# Patient Record
Sex: FEMALE | Race: WHITE | URBAN - METROPOLITAN AREA
[De-identification: names, ages, dates, MRNs, and addresses within clinical notes are randomized per-mention and may not be internally consistent; named-entity substitution may affect disease eponyms.]

---

## 2023-03-15 LAB
AMB EXT ANTIBODY SCREEN: NEGATIVE
AMB EXT CHLAMYDIA, NUCLEIC ACID AMP: NOT DETECTED
AMB EXT GONOCOCCUS, NUCLEIC ACID AMP: NOT DETECTED
AMB EXT HEMATOCRIT: 38.6
AMB EXT HEMOGLOBIN: 12.7
AMB EXT MCV: 88.5
AMB EXT PLATELETS: 255
AMB EXT RH FACTOR: POSITIVE

## 2023-05-03 ENCOUNTER — TELEPHONE (OUTPATIENT)
Dept: OBGYN CLINIC | Facility: CLINIC | Age: 23
End: 2023-05-03

## 2023-05-03 NOTE — TELEPHONE ENCOUNTER
PN records received. Pt notified & M&G appt offered & scheduled. Emphasized importance of continuing care w/ current provider as appt is not a guarantee of acceptance.  Pt verbalized an understanding & agrees w/ plan

## 2023-05-09 ENCOUNTER — OFFICE VISIT (OUTPATIENT)
Dept: OBGYN CLINIC | Facility: CLINIC | Age: 23
End: 2023-05-09

## 2023-05-09 DIAGNOSIS — Z71.89 PRENATAL CONSULT: Primary | ICD-10-CM

## 2023-05-09 NOTE — PROGRESS NOTES
Dhruv Morgan is 21year old, , with current EGA of 25w5d, here for Meet & Greet. C/s in 2020 for failure to descend. Reports she got to complete and pushing. She thinks baby was lyn side up. Feels like she was dismissed a lot in labor and things weren't explained to her. She wants a more personal experience this time. Denies any complications with her first pregnancy. Denies HTN/pre-e or GDM. She desires VTOL as long as its safe for her and baby. Open to RCS if that were recommended/needed. So far this pregnancy has been uncomplicated other than not finding out she was pregnant until she was 4 months along. Has been getting care at [facility] but prefers to deliver at [facility].  CNM Scope of care and philosophy reviewed. Records are at [facility] office. This CNM will review them on Thursday and have RN call pt to get her scheduled for RN ED visit as long as she is appropriate for CNM care after reviewing records. Patient voiced understanding.

## 2023-06-08 ENCOUNTER — TELEPHONE (OUTPATIENT)
Dept: OBGYN CLINIC | Facility: CLINIC | Age: 23
End: 2023-06-08

## 2023-06-08 NOTE — TELEPHONE ENCOUNTER
Per Gladis Border, pt accepted to transfer to this office for prenatal care. Attempted to call pt to schedule education visit and New OB visit. Received voicemail. LMTCB.

## 2023-06-15 LAB
AMB EXT GLUCOSE 1HR OB: 101
AMB EXT HEMATOCRIT: 35.1
AMB EXT HEMOGLOBIN: 11.9
AMB EXT HIV AG AB COMBO: NON REACTIVE

## 2023-06-27 ENCOUNTER — NURSE ONLY (OUTPATIENT)
Dept: OBGYN CLINIC | Facility: CLINIC | Age: 23
End: 2023-06-27
Payer: MEDICAID

## 2023-06-27 ENCOUNTER — INITIAL PRENATAL (OUTPATIENT)
Dept: OBGYN CLINIC | Facility: CLINIC | Age: 23
End: 2023-06-27

## 2023-06-27 VITALS
BODY MASS INDEX: 30 KG/M2 | DIASTOLIC BLOOD PRESSURE: 75 MMHG | WEIGHT: 167 LBS | SYSTOLIC BLOOD PRESSURE: 112 MMHG | HEART RATE: 96 BPM

## 2023-06-27 VITALS — HEIGHT: 63 IN

## 2023-06-27 DIAGNOSIS — O34.219 PREVIOUS CESAREAN DELIVERY AFFECTING PREGNANCY: Primary | ICD-10-CM

## 2023-06-27 PROCEDURE — 0502F SUBSEQUENT PRENATAL CARE: CPT | Performed by: ADVANCED PRACTICE MIDWIFE

## 2023-06-27 PROCEDURE — 3074F SYST BP LT 130 MM HG: CPT | Performed by: ADVANCED PRACTICE MIDWIFE

## 2023-06-27 PROCEDURE — 99211 OFF/OP EST MAY X REQ PHY/QHP: CPT | Performed by: ADVANCED PRACTICE MIDWIFE

## 2023-06-27 PROCEDURE — 3078F DIAST BP <80 MM HG: CPT | Performed by: ADVANCED PRACTICE MIDWIFE

## 2023-07-12 ENCOUNTER — ROUTINE PRENATAL (OUTPATIENT)
Dept: OBGYN CLINIC | Facility: CLINIC | Age: 23
End: 2023-07-12

## 2023-07-12 VITALS
HEART RATE: 80 BPM | BODY MASS INDEX: 30 KG/M2 | DIASTOLIC BLOOD PRESSURE: 72 MMHG | SYSTOLIC BLOOD PRESSURE: 108 MMHG | WEIGHT: 169 LBS

## 2023-07-12 DIAGNOSIS — Z34.90 FUNDAL HEIGHT HIGH FOR DATES: Primary | ICD-10-CM

## 2023-07-12 PROCEDURE — 3074F SYST BP LT 130 MM HG: CPT | Performed by: ADVANCED PRACTICE MIDWIFE

## 2023-07-12 PROCEDURE — 3078F DIAST BP <80 MM HG: CPT | Performed by: ADVANCED PRACTICE MIDWIFE

## 2023-07-12 PROCEDURE — 0502F SUBSEQUENT PRENATAL CARE: CPT | Performed by: ADVANCED PRACTICE MIDWIFE

## 2023-07-12 NOTE — PROGRESS NOTES
Active fetus Denies any complaints. Denies any vaginal bleeding, leaking of fluid or vaginal discharge. No signs signs of PTL. Reviewed S&S of PTL  Warning signs reviewed  All questions answered.    Fundal height large for dates- growth ultrasound ordered

## 2023-07-22 PROBLEM — Z34.90 FUNDAL HEIGHT HIGH FOR DATES: Status: ACTIVE | Noted: 2023-07-22

## 2023-07-22 PROBLEM — Z34.90 FUNDAL HEIGHT HIGH FOR DATES (HCC): Status: ACTIVE | Noted: 2023-07-22

## 2023-07-24 ENCOUNTER — TELEPHONE (OUTPATIENT)
Dept: OBGYN CLINIC | Facility: CLINIC | Age: 23
End: 2023-07-24

## 2023-07-24 ENCOUNTER — ROUTINE PRENATAL (OUTPATIENT)
Dept: OBGYN CLINIC | Facility: CLINIC | Age: 23
End: 2023-07-24

## 2023-07-24 VITALS
HEART RATE: 84 BPM | SYSTOLIC BLOOD PRESSURE: 111 MMHG | DIASTOLIC BLOOD PRESSURE: 69 MMHG | WEIGHT: 169 LBS | BODY MASS INDEX: 30 KG/M2

## 2023-07-24 DIAGNOSIS — O34.219 PREVIOUS CESAREAN DELIVERY AFFECTING PREGNANCY: ICD-10-CM

## 2023-07-24 DIAGNOSIS — Z11.3 SCREENING FOR STDS (SEXUALLY TRANSMITTED DISEASES): ICD-10-CM

## 2023-07-24 DIAGNOSIS — Z34.83 PRENATAL CARE, SUBSEQUENT PREGNANCY IN THIRD TRIMESTER: Primary | ICD-10-CM

## 2023-07-24 PROBLEM — R00.2 PALPITATIONS: Status: RESOLVED | Noted: 2023-07-24 | Resolved: 2023-07-24

## 2023-07-24 PROBLEM — R00.2 PALPITATIONS: Status: ACTIVE | Noted: 2023-07-24

## 2023-07-24 PROBLEM — Z34.90 FUNDAL HEIGHT HIGH FOR DATES: Status: RESOLVED | Noted: 2023-07-22 | Resolved: 2023-07-24

## 2023-07-24 PROBLEM — Z34.90 FUNDAL HEIGHT HIGH FOR DATES (HCC): Status: RESOLVED | Noted: 2023-07-22 | Resolved: 2023-07-24

## 2023-07-24 LAB
APPEARANCE: CLEAR
BILIRUBIN: NEGATIVE
GLUCOSE (URINE DIPSTICK): NEGATIVE MG/DL
KETONES (URINE DIPSTICK): NEGATIVE MG/DL
MULTISTIX LOT#: ABNORMAL NUMERIC
NITRITE, URINE: NEGATIVE
OCCULT BLOOD: NEGATIVE
PH, URINE: 7 (ref 4.5–8)
SPECIFIC GRAVITY: 1.01 (ref 1–1.03)
URINE-COLOR: YELLOW
UROBILINOGEN,SEMI-QN: 0.2 MG/DL (ref 0–1.9)

## 2023-07-24 PROCEDURE — 3074F SYST BP LT 130 MM HG: CPT | Performed by: ADVANCED PRACTICE MIDWIFE

## 2023-07-24 PROCEDURE — 3078F DIAST BP <80 MM HG: CPT | Performed by: ADVANCED PRACTICE MIDWIFE

## 2023-07-24 PROCEDURE — 0502F SUBSEQUENT PRENATAL CARE: CPT | Performed by: ADVANCED PRACTICE MIDWIFE

## 2023-07-24 PROCEDURE — 81002 URINALYSIS NONAUTO W/O SCOPE: CPT | Performed by: ADVANCED PRACTICE MIDWIFE

## 2023-07-24 NOTE — PROGRESS NOTES
Patient seen in conjunction with [facility]. I personally witnessed the patient's exam and medical decision making on this date of service. I was physically present in the room for the performance of the E/M service. I have reviewed the CNM student's documentation and findings including history, Exam, and Medical Decision Making, edited the document for accuracy and verify that it represents the clinical findings and services performed.

## 2023-07-24 NOTE — TELEPHONE ENCOUNTER
I spoke to Maria Luisa from [facility] medical records department. I requested the Operative report as requested by Roni Dill. Per Maria Luisa she will fax the report by today. BUDDY faxed to [phone] and confirmation sheet was received. I sent them out to [facility].      Reji [address]: [phone]

## 2023-07-24 NOTE — PROGRESS NOTES
Stephanie Alcantara, is at 36w4d, here for her ARTIE visit. Denies 3rd trimester danger signs. Currently, she is feeling well aside from endorsing urinary frequency/incomplete emptying that started ~2 weeks ago. Denies fevers, chills, or flank pain. Has not met with OB team for [facility] consult: was undecided on repeat CS vs TOLAC but after review of options/risks/benefits today, would like to [facility]. Vital signs and weight reviewed  See flowsheets      Assessment/Plan: GBS collected today  UTI symptoms: urine culture sent, will treat based on results   BUDDY signed/faxed to [facility]. Morgan's today. Will need OB consult ASAP once report received   consent signed today and sent for scan  Next visit: 1 week     Reviewed:   Prenatal visit schedule  3rd trimester precautions and expectations  Flushing with PO water    labor precautions  Kick counts  Danger signs and when to call CNM     Pt verbalized understanding. All questions answered. No barriers to learning identified    CARLOS Roland/DALTON under direct supervision of Esvin Sevilla CNM    Patient seen in conjunction with CARLOS. I personally witnessed the patient's exam and medical decision making on this date of service. I was physically present in the room for the performance of the E/M service. I have reviewed the BETITO student's documentation and findings including history, Exam, and Medical Decision Making, edited the document for accuracy and verify that it represents the clinical findings and services performed.

## 2023-07-25 LAB
C TRACH DNA SPEC QL NAA+PROBE: NEGATIVE
N GONORRHOEA DNA SPEC QL NAA+PROBE: NEGATIVE

## 2023-07-26 LAB — GROUP B STREP BY PCR FOR PCR OVT: NEGATIVE

## 2023-07-27 ENCOUNTER — HOSPITAL ENCOUNTER (OUTPATIENT)
Dept: ULTRASOUND IMAGING | Facility: HOSPITAL | Age: 23
Discharge: HOME | End: 2023-07-27
Attending: ADVANCED PRACTICE MIDWIFE
Payer: MEDICAID

## 2023-07-27 DIAGNOSIS — Z34.90 FUNDAL HEIGHT HIGH FOR DATES: ICD-10-CM

## 2023-07-27 PROCEDURE — 76816 OB US FOLLOW-UP PER FETUS: CPT | Performed by: ADVANCED PRACTICE MIDWIFE

## 2023-08-03 ENCOUNTER — TELEPHONE (OUTPATIENT)
Dept: OBGYN CLINIC | Facility: CLINIC | Age: 23
End: 2023-08-03

## 2023-08-03 ENCOUNTER — ROUTINE PRENATAL (OUTPATIENT)
Dept: OBGYN CLINIC | Facility: CLINIC | Age: 23
End: 2023-08-03

## 2023-08-03 VITALS
SYSTOLIC BLOOD PRESSURE: 128 MMHG | HEART RATE: 89 BPM | DIASTOLIC BLOOD PRESSURE: 89 MMHG | WEIGHT: 170 LBS | BODY MASS INDEX: 30 KG/M2

## 2023-08-03 DIAGNOSIS — N89.8 VAGINAL DISCHARGE: ICD-10-CM

## 2023-08-03 DIAGNOSIS — Z34.83 ENCOUNTER FOR SUPERVISION OF OTHER NORMAL PREGNANCY IN THIRD TRIMESTER: Primary | ICD-10-CM

## 2023-08-03 DIAGNOSIS — Z11.3 SCREENING FOR STDS (SEXUALLY TRANSMITTED DISEASES): ICD-10-CM

## 2023-08-03 LAB
APPEARANCE: CLEAR
BILIRUBIN: NEGATIVE
GLUCOSE (URINE DIPSTICK): NEGATIVE MG/DL
KETONES (URINE DIPSTICK): NEGATIVE MG/DL
LEUKOCYTES: NEGATIVE
MULTISTIX LOT#: NORMAL NUMERIC
NITRITE, URINE: NEGATIVE
OCCULT BLOOD: NEGATIVE
PH, URINE: 7.5 (ref 4.5–8)
PROTEIN (URINE DIPSTICK): NEGATIVE MG/DL
SPECIFIC GRAVITY: 1.01 (ref 1–1.03)
URINE-COLOR: YELLOW
UROBILINOGEN,SEMI-QN: 1 MG/DL (ref 0–1.9)

## 2023-08-03 PROCEDURE — 87491 CHLMYD TRACH DNA AMP PROBE: CPT | Performed by: ADVANCED PRACTICE MIDWIFE

## 2023-08-03 PROCEDURE — 87086 URINE CULTURE/COLONY COUNT: CPT | Performed by: ADVANCED PRACTICE MIDWIFE

## 2023-08-03 PROCEDURE — 87591 N.GONORRHOEAE DNA AMP PROB: CPT | Performed by: ADVANCED PRACTICE MIDWIFE

## 2023-08-03 PROCEDURE — 87808 TRICHOMONAS ASSAY W/OPTIC: CPT | Performed by: ADVANCED PRACTICE MIDWIFE

## 2023-08-03 PROCEDURE — 87106 FUNGI IDENTIFICATION YEAST: CPT | Performed by: ADVANCED PRACTICE MIDWIFE

## 2023-08-03 PROCEDURE — 87205 SMEAR GRAM STAIN: CPT | Performed by: ADVANCED PRACTICE MIDWIFE

## 2023-08-03 NOTE — PROGRESS NOTES
Darylene Plan is a 21year old , at 38w0d, here for her return OB visit. Currently, she is feeling poor. Reports since yesterday she has been having low back pain that comes and goes, pelvic pressure, vaginal discharge, burning and irritation. She thinks she may be in early labor but she is not sure. She denies any VB or LOF. Endorses active fetus. Vital signs and weight reviewed  See flowsheets  SSE with small amount of mucousy discharge, cultures collected   SVE 2-3cm/long/-3; soft, midposition    Today's Assessment/Plan:   Discussed her symptoms may be early labor or could be the result of a vaginal infection. Labor precautions reviewed. Pt instructed to time contractions and call when they are 5 minutes apart even if not painful. Call if she is having painful contractions. Discussed importance of good hydration, rest and nourishment. Discussed importance of monitoring fetal movement. Discussed importance of scheduling OB consult visit for VTOL, pt given number to schedule and instructed to call right away. Next visit: Follow up OB: 1 week    Reviewed:   3rd trimester precautions and expectations  Labor precautions  Kick counts  Danger signs  Prenatal visit schedule      Pt verbalized understanding. All questions answered.  No barriers to learning identified

## 2023-08-04 LAB
C TRACH DNA SPEC QL NAA+PROBE: NEGATIVE
N GONORRHOEA DNA SPEC QL NAA+PROBE: NEGATIVE

## 2023-08-04 RX ORDER — METRONIDAZOLE 7.5 MG/G
1 GEL VAGINAL NIGHTLY
Qty: 70 G | Refills: 0 | Status: SHIPPED | OUTPATIENT
Start: 2023-08-04 | End: 2023-08-07

## 2023-08-05 LAB
GENITAL VAGINOSIS SCREEN: POSITIVE
TRICHOMONAS SCREEN: NEGATIVE

## 2023-08-07 ENCOUNTER — INITIAL PRENATAL (OUTPATIENT)
Dept: OBGYN CLINIC | Facility: CLINIC | Age: 23
End: 2023-08-07
Payer: MEDICAID

## 2023-08-07 VITALS
SYSTOLIC BLOOD PRESSURE: 104 MMHG | BODY MASS INDEX: 30.3 KG/M2 | HEIGHT: 63 IN | WEIGHT: 171 LBS | DIASTOLIC BLOOD PRESSURE: 62 MMHG

## 2023-08-07 DIAGNOSIS — O34.219 PREVIOUS CESAREAN DELIVERY AFFECTING PREGNANCY: Primary | ICD-10-CM

## 2023-08-07 RX ORDER — PNV,CALCIUM 72/IRON/FOLIC ACID 27 MG-1 MG
1 TABLET ORAL DAILY
COMMUNITY
Start: 2023-03-22

## 2023-08-07 NOTE — PROGRESS NOTES
Here for consult for . Patient had last c/s for failure to descend after pushing. DW pt risk of uterine rupture/fetal distress with potential fetal death or disability. DW pt that she will not be induced and asked her to choose a date that she would no longer want to be pregnant. This appt required 30 minutes to gather data, examine pt, /educate pt, coordinate care and document.

## 2023-08-10 ENCOUNTER — ROUTINE PRENATAL (OUTPATIENT)
Dept: OBGYN CLINIC | Facility: CLINIC | Age: 23
End: 2023-08-10

## 2023-08-10 VITALS
BODY MASS INDEX: 30 KG/M2 | SYSTOLIC BLOOD PRESSURE: 111 MMHG | DIASTOLIC BLOOD PRESSURE: 76 MMHG | HEART RATE: 105 BPM | WEIGHT: 172 LBS

## 2023-08-10 DIAGNOSIS — Z34.83 ENCOUNTER FOR SUPERVISION OF OTHER NORMAL PREGNANCY IN THIRD TRIMESTER: Primary | ICD-10-CM

## 2023-08-10 PROCEDURE — 0502F SUBSEQUENT PRENATAL CARE: CPT | Performed by: ADVANCED PRACTICE MIDWIFE

## 2023-08-10 PROCEDURE — 3074F SYST BP LT 130 MM HG: CPT | Performed by: ADVANCED PRACTICE MIDWIFE

## 2023-08-10 PROCEDURE — 3078F DIAST BP <80 MM HG: CPT | Performed by: ADVANCED PRACTICE MIDWIFE

## 2023-08-10 NOTE — PROGRESS NOTES
Peter Gomez is a 21year old , at 39w0d, here for her return OB visit. Currently, she is feeling ok. Denies regular contractions, bleeding, and leakage of fluid. Endorses active fetus. Feeling a lot of pelvic pressure lately that is very uncomfortable. Has not picked up medicine for BV yet. She thinks baby has dropped. She would like membrane sweep today. Vital signs and weight reviewed  See flowsheets     Today's Assessment/Plan:   Membrane sweep performed per pt request  Recognizing labor reviewed  Recommended curb walking and miles circuit    Next visit: Follow up OB: 1 week    Reviewed:   3rd trimester precautions and expectations  Labor precautions  Kick counts  Danger signs    Pt verbalized understanding. All questions answered.  No barriers to learning identified

## 2023-08-12 ENCOUNTER — ANESTHESIA (OUTPATIENT)
Dept: OBGYN UNIT | Facility: HOSPITAL | Age: 23
End: 2023-08-12
Payer: MEDICAID

## 2023-08-12 ENCOUNTER — MOBILE ENCOUNTER (OUTPATIENT)
Dept: OBGYN CLINIC | Facility: CLINIC | Age: 23
End: 2023-08-12

## 2023-08-12 ENCOUNTER — HOSPITAL ENCOUNTER (INPATIENT)
Facility: HOSPITAL | Age: 23
LOS: 2 days | Discharge: HOME | End: 2023-08-14
Attending: ADVANCED PRACTICE MIDWIFE | Admitting: OBSTETRICS & GYNECOLOGY
Payer: MEDICAID

## 2023-08-12 ENCOUNTER — ANESTHESIA EVENT (OUTPATIENT)
Dept: OBGYN UNIT | Facility: HOSPITAL | Age: 23
End: 2023-08-12
Payer: MEDICAID

## 2023-08-12 PROBLEM — Z34.90 TERM PREGNANCY (HCC): Status: ACTIVE | Noted: 2023-08-12

## 2023-08-12 PROBLEM — Z34.90 PREGNANCY: Status: ACTIVE | Noted: 2023-08-12

## 2023-08-12 PROBLEM — Z34.90 TERM PREGNANCY: Status: ACTIVE | Noted: 2023-08-12

## 2023-08-12 PROBLEM — Z34.90 PREGNANCY (HCC): Status: ACTIVE | Noted: 2023-08-12

## 2023-08-12 LAB
ANTIBODY SCREEN: NEGATIVE
BASOPHILS # BLD AUTO: 0.02 X10(3) UL (ref 0–0.2)
BASOPHILS NFR BLD AUTO: 0.2 %
DEPRECATED RDW RBC AUTO: 38.5 FL (ref 35.1–46.3)
EOSINOPHIL # BLD AUTO: 0.13 X10(3) UL (ref 0–0.7)
EOSINOPHIL NFR BLD AUTO: 1.4 %
ERYTHROCYTE [DISTWIDTH] IN BLOOD BY AUTOMATED COUNT: 12.1 % (ref 11–15)
HCT VFR BLD AUTO: 37 %
HGB BLD-MCNC: 12 G/DL
IMM GRANULOCYTES # BLD AUTO: 0.04 X10(3) UL (ref 0–1)
IMM GRANULOCYTES NFR BLD: 0.4 %
LYMPHOCYTES # BLD AUTO: 2.37 X10(3) UL (ref 1–4)
LYMPHOCYTES NFR BLD AUTO: 24.9 %
MCH RBC QN AUTO: 28.4 PG (ref 26–34)
MCHC RBC AUTO-ENTMCNC: 32.4 G/DL (ref 31–37)
MCV RBC AUTO: 87.7 FL
MONOCYTES # BLD AUTO: 0.47 X10(3) UL (ref 0.1–1)
MONOCYTES NFR BLD AUTO: 4.9 %
NEUTROPHILS # BLD AUTO: 6.48 X10 (3) UL (ref 1.5–7.7)
NEUTROPHILS # BLD AUTO: 6.48 X10(3) UL (ref 1.5–7.7)
NEUTROPHILS NFR BLD AUTO: 68.2 %
PLATELET # BLD AUTO: 275 10(3)UL (ref 150–450)
RBC # BLD AUTO: 4.22 X10(6)UL
RH BLOOD TYPE: POSITIVE
WBC # BLD AUTO: 9.5 X10(3) UL (ref 4–11)

## 2023-08-12 RX ORDER — NALBUPHINE HYDROCHLORIDE 10 MG/ML
2.5 INJECTION, SOLUTION INTRAMUSCULAR; INTRAVENOUS; SUBCUTANEOUS
Status: DISCONTINUED | OUTPATIENT
Start: 2023-08-12 | End: 2023-08-14

## 2023-08-12 RX ORDER — DEXTROSE, SODIUM CHLORIDE, SODIUM LACTATE, POTASSIUM CHLORIDE, AND CALCIUM CHLORIDE 5; .6; .31; .03; .02 G/100ML; G/100ML; G/100ML; G/100ML; G/100ML
INJECTION, SOLUTION INTRAVENOUS CONTINUOUS
Status: DISCONTINUED | OUTPATIENT
Start: 2023-08-12 | End: 2023-08-13 | Stop reason: HOSPADM

## 2023-08-12 RX ORDER — ACETAMINOPHEN 500 MG
1000 TABLET ORAL EVERY 6 HOURS PRN
Status: DISCONTINUED | OUTPATIENT
Start: 2023-08-12 | End: 2023-08-13 | Stop reason: HOSPADM

## 2023-08-12 RX ORDER — BUPIVACAINE HCL/0.9 % NACL/PF 0.25 %
5 PLASTIC BAG, INJECTION (ML) EPIDURAL AS NEEDED
Status: DISCONTINUED | OUTPATIENT
Start: 2023-08-12 | End: 2023-08-14

## 2023-08-12 RX ORDER — LIDOCAINE HYDROCHLORIDE 10 MG/ML
30 INJECTION, SOLUTION EPIDURAL; INFILTRATION; INTRACAUDAL; PERINEURAL ONCE
Status: COMPLETED | OUTPATIENT
Start: 2023-08-12 | End: 2023-08-13

## 2023-08-12 RX ORDER — BUPIVACAINE HYDROCHLORIDE 2.5 MG/ML
20 INJECTION, SOLUTION EPIDURAL; INFILTRATION; INTRACAUDAL ONCE
Status: DISCONTINUED | OUTPATIENT
Start: 2023-08-12 | End: 2023-08-13 | Stop reason: HOSPADM

## 2023-08-12 RX ORDER — ONDANSETRON 2 MG/ML
4 INJECTION INTRAMUSCULAR; INTRAVENOUS EVERY 6 HOURS PRN
Status: DISCONTINUED | OUTPATIENT
Start: 2023-08-12 | End: 2023-08-13 | Stop reason: HOSPADM

## 2023-08-12 RX ORDER — IBUPROFEN 600 MG/1
600 TABLET ORAL ONCE AS NEEDED
Status: DISCONTINUED | OUTPATIENT
Start: 2023-08-12 | End: 2023-08-13 | Stop reason: HOSPADM

## 2023-08-12 RX ORDER — TERBUTALINE SULFATE 1 MG/ML
0.25 INJECTION, SOLUTION SUBCUTANEOUS AS NEEDED
Status: DISCONTINUED | OUTPATIENT
Start: 2023-08-12 | End: 2023-08-13 | Stop reason: HOSPADM

## 2023-08-12 RX ORDER — AMOXICILLIN 500 MG/1
500 CAPSULE ORAL 2 TIMES DAILY
COMMUNITY
End: 2023-08-14

## 2023-08-12 RX ORDER — SODIUM CHLORIDE, SODIUM LACTATE, POTASSIUM CHLORIDE, CALCIUM CHLORIDE 600; 310; 30; 20 MG/100ML; MG/100ML; MG/100ML; MG/100ML
INJECTION, SOLUTION INTRAVENOUS AS NEEDED
Status: DISCONTINUED | OUTPATIENT
Start: 2023-08-12 | End: 2023-08-13 | Stop reason: HOSPADM

## 2023-08-12 RX ORDER — ACETAMINOPHEN 500 MG
500 TABLET ORAL EVERY 6 HOURS PRN
Status: DISCONTINUED | OUTPATIENT
Start: 2023-08-12 | End: 2023-08-13 | Stop reason: HOSPADM

## 2023-08-12 RX ORDER — TRISODIUM CITRATE DIHYDRATE AND CITRIC ACID MONOHYDRATE 500; 334 MG/5ML; MG/5ML
30 SOLUTION ORAL AS NEEDED
Status: DISCONTINUED | OUTPATIENT
Start: 2023-08-12 | End: 2023-08-13 | Stop reason: HOSPADM

## 2023-08-13 PROBLEM — O34.219 VAGINAL BIRTH AFTER CESAREAN (HCC): Status: ACTIVE | Noted: 2023-08-13

## 2023-08-13 PROBLEM — O34.219 VAGINAL BIRTH AFTER CESAREAN: Status: ACTIVE | Noted: 2023-08-13

## 2023-08-13 LAB — RH BLOOD TYPE: POSITIVE

## 2023-08-13 PROCEDURE — 0UQGXZZ REPAIR VAGINA, EXTERNAL APPROACH: ICD-10-PCS | Performed by: ADVANCED PRACTICE MIDWIFE

## 2023-08-13 RX ORDER — DOCUSATE SODIUM 100 MG/1
100 CAPSULE, LIQUID FILLED ORAL
Status: DISCONTINUED | OUTPATIENT
Start: 2023-08-13 | End: 2023-08-14

## 2023-08-13 RX ORDER — NALBUPHINE HYDROCHLORIDE 10 MG/ML
2.5 INJECTION, SOLUTION INTRAMUSCULAR; INTRAVENOUS; SUBCUTANEOUS
Status: DISCONTINUED | OUTPATIENT
Start: 2023-08-13 | End: 2023-08-14

## 2023-08-13 RX ORDER — BUPIVACAINE HYDROCHLORIDE 2.5 MG/ML
20 INJECTION, SOLUTION EPIDURAL; INFILTRATION; INTRACAUDAL ONCE
Status: DISCONTINUED | OUTPATIENT
Start: 2023-08-13 | End: 2023-08-13 | Stop reason: HOSPADM

## 2023-08-13 RX ORDER — IBUPROFEN 600 MG/1
600 TABLET ORAL EVERY 6 HOURS
Status: DISCONTINUED | OUTPATIENT
Start: 2023-08-13 | End: 2023-08-14

## 2023-08-13 RX ORDER — ONDANSETRON 2 MG/ML
4 INJECTION INTRAMUSCULAR; INTRAVENOUS EVERY 6 HOURS PRN
Status: DISCONTINUED | OUTPATIENT
Start: 2023-08-13 | End: 2023-08-14

## 2023-08-13 RX ORDER — LIDOCAINE HYDROCHLORIDE AND EPINEPHRINE 15; 5 MG/ML; UG/ML
INJECTION, SOLUTION EPIDURAL AS NEEDED
Status: DISCONTINUED | OUTPATIENT
Start: 2023-08-13 | End: 2023-08-13 | Stop reason: SURG

## 2023-08-13 RX ORDER — DIAPER,BRIEF,INFANT-TODD,DISP
1 EACH MISCELLANEOUS EVERY 6 HOURS PRN
Status: DISCONTINUED | OUTPATIENT
Start: 2023-08-13 | End: 2023-08-14

## 2023-08-13 RX ORDER — BISACODYL 10 MG
10 SUPPOSITORY, RECTAL RECTAL ONCE AS NEEDED
Status: DISCONTINUED | OUTPATIENT
Start: 2023-08-13 | End: 2023-08-14

## 2023-08-13 RX ORDER — ACETAMINOPHEN 500 MG
1000 TABLET ORAL EVERY 6 HOURS PRN
Status: DISCONTINUED | OUTPATIENT
Start: 2023-08-13 | End: 2023-08-14

## 2023-08-13 RX ORDER — ACETAMINOPHEN 500 MG
500 TABLET ORAL EVERY 6 HOURS PRN
Status: DISCONTINUED | OUTPATIENT
Start: 2023-08-13 | End: 2023-08-14

## 2023-08-13 RX ORDER — LIDOCAINE HYDROCHLORIDE 10 MG/ML
INJECTION, SOLUTION EPIDURAL; INFILTRATION; INTRACAUDAL; PERINEURAL AS NEEDED
Status: DISCONTINUED | OUTPATIENT
Start: 2023-08-13 | End: 2023-08-13 | Stop reason: SURG

## 2023-08-13 RX ORDER — SIMETHICONE 80 MG
80 TABLET,CHEWABLE ORAL 3 TIMES DAILY PRN
Status: DISCONTINUED | OUTPATIENT
Start: 2023-08-13 | End: 2023-08-14

## 2023-08-13 RX ORDER — AMMONIA INHALANTS 0.04 G/.3ML
0.3 INHALANT RESPIRATORY (INHALATION) AS NEEDED
Status: DISCONTINUED | OUTPATIENT
Start: 2023-08-13 | End: 2023-08-14

## 2023-08-13 RX ORDER — BUPIVACAINE HCL/0.9 % NACL/PF 0.25 %
5 PLASTIC BAG, INJECTION (ML) EPIDURAL AS NEEDED
Status: DISCONTINUED | OUTPATIENT
Start: 2023-08-13 | End: 2023-08-14

## 2023-08-13 RX ADMIN — LIDOCAINE HYDROCHLORIDE AND EPINEPHRINE 3 ML: 15; 5 INJECTION, SOLUTION EPIDURAL at 02:09:00

## 2023-08-13 RX ADMIN — LIDOCAINE HYDROCHLORIDE 5 ML: 10 INJECTION, SOLUTION EPIDURAL; INFILTRATION; INTRACAUDAL; PERINEURAL at 02:00:00

## 2023-08-13 NOTE — PROGRESS NOTES
Transferred Mother to room   370    via wheelchair accompanied by S.O., in stable condition. Report given to [facility]. Bed in locked and low position, side rails up x 2, ID's checked and verified, call light with in reach, reinforced pt to call for assistance when needs  to go to the bathroom.

## 2023-08-13 NOTE — PLAN OF CARE
Problem: BIRTH - VAGINAL/ SECTION  Goal: Fetal and maternal status remain reassuring during the birth process  Description: INTERVENTIONS:  - Monitor vital signs  - Monitor fetal heart rate  - Monitor uterine activity  - Monitor labor progression (vaginal delivery)  - DVT prophylaxis (C/S delivery)  - Surgical antibiotic prophylaxis (C/S delivery)  Outcome: Progressing     Problem: PAIN - ADULT  Goal: Verbalizes/displays adequate comfort level or patient's stated pain goal  Description: INTERVENTIONS:  - Encourage pt to monitor pain and request assistance  - Assess pain using appropriate pain scale  - Administer analgesics based on type and severity of pain and evaluate response  - Implement non-pharmacological measures as appropriate and evaluate response  - Consider cultural and social influences on pain and pain management  - Manage/alleviate anxiety  - Utilize distraction and/or relaxation techniques  - Monitor for opioid side effects  - Notify MD/LIP if interventions unsuccessful or patient reports new pain  - Anticipate increased pain with activity and pre-medicate as appropriate  Outcome: Progressing     Problem: ANXIETY  Goal: Will report anxiety at manageable levels  Description: INTERVENTIONS:  - Administer medication as ordered  - Teach and rehearse alternative coping skills  - Provide emotional support with 1:1 interaction with staff  Outcome: Progressing     Problem: Patient Centered Care  Goal: Patient preferences are identified and integrated in the patient's plan of care  Description: Interventions:  - What would you like us to know as we care for you?  TOLAC  - Provide timely, complete, and accurate information to patient/family  - Incorporate patient and family knowledge, values, beliefs, and cultural backgrounds into the planning and delivery of care  - Encourage patient/family to participate in care and decision-making at the level they choose  - Honor patient and family perspectives and choices  Outcome: Progressing     Problem: Patient/Family Goals  Goal: Patient/Family Long Term Goal  Description: Patient's Long Term Goal: uncomplicated vaginal delivery    Interventions:  - See additional Care Plan goals for specific interventions  Outcome: Progressing  Goal: Patient/Family Short Term Goal  Description: Patient's Short Term Goal: pain control    Interventions:   - epidural  - See additional Care Plan goals for specific interventions  Outcome: Progressing

## 2023-08-13 NOTE — L&D DELIVERY NOTE
Tu Costello [[id number]]      Labor Events     labor?: No   steroids?: None  Antibiotics received during labor?: No  Rupture date/time: 2023 0500     Rupture type: AROM  Fluid color: Clear       Labor Event Times    Dilation complete date/time: 2023 0661       [place] Presentation    No data filed       Operative Delivery    No data filed       Shoulder Dystocia    No data filed       Anesthesia    No data filed       [place] Delivery      Head delivery date/time: 2023 10:13:38   Delivery date/time:  23 10:13:56       Details:            Delivery Providers       Delivery personnel:  Provider Role    Baby Nurse    Delivery Nurse             Cord    Vessels: 3 Vessels  Timed cord clamping: Yes  Cord blood disposition: to lab  Gases sent?: No       Resuscitation    Method: None        Measurements      Weight: 3240 g 7 lb 2.3 oz Length: 52 cm     Head circum.: 35.5 cm              Placenta    Date/time: 2023 1020  Removal: Spontaneous  Appearance: Intact  Disposition: Discarded       Apgars    Living status: Living   Apgar Scoring Key:    0 1 2    Skin color Blue or pale Acrocyanotic Completely pink    Heart rate Absent <100 bpm >100 bpm    Reflex irritability No response Grimace Cry or active withdrawal    Muscle tone Limp Some flexion Active motion    Respiratory effort Absent Weak cry; hypoventilation Good, crying              1 Minute:  5 Minute:  10 Minute:  15 Minute:  20 Minute:      Skin color: 0  1       Heart rate: 2  2       Reflex irritablity: 2  2       Muscle tone: 2  2       Respiratory effort: 2  2       Total: 8  9          Apgars assigned by: Mariano Coronado RN  [place] disposition: with mother       Skin to Skin    Skin to skin initiated date/time: 2023 1013  Skin to skin with:  Mother       Vaginal Count    Initial count RN: Romelia Rondon RN  Initial count Tech: Marletta Murders, Bina   Sponges   Sharps    Initial counts 10   0    Final counts 10   1 Final count RN: Bennie Allred RN  Final count MD: Blayne Sarmiento CNM       Delivery (Maternal)    Episiotomy: None  Vaginal laceration?: Yes Repaired?: Yes                 Surinder June progressed to complete dilatation and 0 station prior to pushing successfully to viable baby boy. See Delivery Summary above for time, APGARs, and weight. Fetal head presented in the JACQUES position. Sweep for nuchal cord was performed and no nuchal cord identified. Anterior shoulder delivered spontaneously without traction. Baby vigorous at birth. To maternal abdomen for dry and stimulation then cord cut after pulsing ceased. Prophylactic IV pitocin initiated in 3rd stage. Spontaneous placenta by smith mechanism, complete with 3 vessel cord. Hemostasis achieved by fundal massage and prophylactic IV Pitocin. Vagina and perineum inspected and small first degree at introitus repaired due to bleeding. Mother and infant appeared in stable condition when midwife left the delivery room. Sharps and 4x4 counts were correct. Skin to skin initiated.     Quantitative Blood Loss (mL)  225

## 2023-08-13 NOTE — PROGRESS NOTES
Pt is a 21year old female admitted to [place] [address]. Patient presents with:  Contractions: Starting at 06:00 today and becoming strong and regular since 13:00. Pt is  39w3d intra-uterine pregnancy. History obtained, consents signed. Oriented to room, staff, and plan of care.

## 2023-08-13 NOTE — PROGRESS NOTES
Patient transferred from  to  in stable condition via wheelchair with her  and baby at this time. RN orients patient and family member to room, postpartum routine, POC, and call light. RN educates patient how to order food, patient reports that she ate in labor and delivery. RN educates patient on pain management POC, pt denies pain at this time. Patient is nota fall risk, but RN instructs patient to call PCT or RN to assist patient to the bathroom x1 to assist with pericare and ensure she can void post delivery. Patient has no current questions or concerns at this time. The next assessment will be at 1500. RN will cluster care to promote rest and bonding. Pt  at bedside, pt holding , pt belongings and call light within reach.

## 2023-08-13 NOTE — PLAN OF CARE
Problem: BIRTH - VAGINAL/ SECTION  Goal: Fetal and maternal status remain reassuring during the birth process  Description: INTERVENTIONS:  - Monitor vital signs  - Monitor fetal heart rate  - Monitor uterine activity  - Monitor labor progression (vaginal delivery)  - DVT prophylaxis (C/S delivery)  - Surgical antibiotic prophylaxis (C/S delivery)  Outcome: Progressing     Problem: PAIN - ADULT  Goal: Verbalizes/displays adequate comfort level or patient's stated pain goal  Description: INTERVENTIONS:  - Encourage pt to monitor pain and request assistance  - Assess pain using appropriate pain scale  - Administer analgesics based on type and severity of pain and evaluate response  - Implement non-pharmacological measures as appropriate and evaluate response  - Consider cultural and social influences on pain and pain management  - Manage/alleviate anxiety  - Utilize distraction and/or relaxation techniques  - Monitor for opioid side effects  - Notify MD/LIP if interventions unsuccessful or patient reports new pain  - Anticipate increased pain with activity and pre-medicate as appropriate  Outcome: Progressing     Problem: ANXIETY  Goal: Will report anxiety at manageable levels  Description: INTERVENTIONS:  - Administer medication as ordered  - Teach and rehearse alternative coping skills  - Provide emotional support with 1:1 interaction with staff  Outcome: Progressing     Problem: Patient Centered Care  Goal: Patient preferences are identified and integrated in the patient's plan of care  Description: Interventions:  - What would you like us to know as we care for you?   - Provide timely, complete, and accurate information to patient/family  - Incorporate patient and family knowledge, values, beliefs, and cultural backgrounds into the planning and delivery of care  - Encourage patient/family to participate in care and decision-making at the level they choose  - Honor patient and family perspectives and choices  Outcome: Progressing     Problem: Patient/Family Goals  Goal: Patient/Family Long Term Goal  Description: Patient's Long Term Goal: Patient's Long Term Goal: Uncomplicated Delivery     Interventions:  - Assessment/Monitoring  - Induction/Augmentation per protocol and Provider order  - C/S per protocol and Provider order   - Education  - Intervention per protocol and Provider order with education   - Involve patient in POC  - See additional Care Plan goals for specific interventions  - See additional Care Plan goals for specific interventions  Outcome: Progressing  Goal: Patient/Family Short Term Goal  Description: Patient's Short Term Goal: Patient's Short Term Goal: Comfort and Pain Control     Interventions:   - Non Pharmacological pain intervention   - IV/IM and Epidural pain medication per Provider order and patient request  - Education  - Involve Patient in POC   - See additional Care Plan goals for specific interventions  Outcome: Progressing

## 2023-08-13 NOTE — ANESTHESIA PROCEDURE NOTES
Labor Analgesia    Date/Time: 8/13/2023 1:58 AM    Performed by: Adri Lares MD  Authorized by: Adri Lares MD      General Information and Staff    Start Time:  8/13/2023 1:58 AM  End Time:  8/13/2023 2:13 AM  Anesthesiologist:  Adri Lares MD  Performed by:   Anesthesiologist  Patient Location:  OB  Site Identification: surface landmarks    Reason for Block: labor epidural    Preanesthetic Checklist: patient identified, IV checked, site marked, risks and benefits discussed, monitors and equipment checked, pre-op evaluation, timeout performed, anesthesia consent and sterile technique used      Procedure Details    Patient Position:  Sitting  Prep: ChloraPrep    Monitoring:  Heart rate  Approach:  Midline    Epidural Needle    Injection Technique:  SARAH saline  Needle Type:  Tuohy  Needle Gauge:  18 G  Needle Length:  3.5 in  Needle Insertion Depth:  6  Location:  L2-3    Spinal Needle      Catheter    Catheter Type:  Multi-orifice  Catheter Size:  20 G  Catheter at Skin Depth:  11  Test Dose:  Negative    Assessment    Sensory Level:  T10    Additional Comments

## 2023-08-13 NOTE — H&P
[facility] Patient Status:  Inpatient    2000 MRN [id number]   Location [address] [place] [address] Attending Kristi Shelton, [address] Day # 0 Admitting Milind Mcmahon MD     Date of Admission:  2023      HPI:   Millicent Gonsalez is 21year old and  with current gestational age of 44w2d and estimated due date of 2023 by 15wga ultrasound. You Cheek is being admitted for labor management. Her current obstetrical history is significant for  prior LTCS . Patient reports contractions since this AM becoming stronger and more frequent late this afternoon. Denies leaking of fluid. Has bloody show  . Fetal Movement: normal.     History   Obstetric History:   OB History    Para Term  AB Living   2 1 1 0 0 1   SAB IAB Ectopic Multiple Live Births   0 0 0 0 1      # Outcome Date GA Lbr Van/2nd Weight Sex Delivery Anes PTL Lv   2 Current            1 Term 2020 40w0d  7 lb 1 oz (3.204 kg) F CS-Unspec EPI  LATRICE      Complications: Prolonged 2nd stage, Failure to progress     Past Medical History: History reviewed. No pertinent past medical history. Past Social History:   Past Surgical History:   Procedure Laterality Date          WISDOM TEETH REMOVED       Family History:   Family History   Problem Relation Age of Onset    Hypertension Father     Diabetes Father     Hypertension Paternal Grandmother     Diabetes Paternal Grandmother      Social History: Social History    Tobacco Use      Smoking status: Never      Smokeless tobacco: Never    Alcohol use: Not Currently       Allergies/Medications: Allergies:   No Known Allergies  Medications:  Prenatal Vit-Fe Fumarate-FA (WESTAB PLUS) 27-1 MG Oral Tab, Take 1 tablet by mouth daily. , Disp: , Rfl: , Past Week at 0700  amoxicillin 500 MG Oral Cap, Take 1 capsule (500 mg total) by mouth 2 (two) times daily. , Disp: , Rfl:         Review of Systems: Constitutional: denies fever, aches, chills  HEENT: denies visual changes  Skin: denies any unusual skin lesions or itching  Lungs: denies shortness of breath  Cardiovascular: denies chest pain  GI: denies abdominal pain,denies heartburn, denies constipation or diarrhea  : denies dysuria, pelvic pain, vaginal discharge or bleeding  Musculoskeletal: denies back or joint pain  Neuro denies headaches or dizziness  Psych: denies depression or anxiety    Physical Exam:   Pulse:  [98] 98  BP: (118)/(70) 118/70    Constitutional: alert, appears stated age, and cooperative  Skin: no lesions or erythema  Respiratory: unlabored  Cardiac: regular rate  Abdomen: soft, non-tender, EFW 3200g, presentation cephalic, uterine contractions q3 minutes, lasting 60 seconds, moderate to palpation  Fetal Surveillance:  135 BPM; Fetal heart variability: moderate  Fetal Heart Rate decelerations: none  Fetal Heart Rate accelerations: yes  External Genitalia:  No lesions  Sterile vaginal exam:  4-5/80/-2, posterior, cephalic by Triage RN  Extremities: extremities normal, atraumatic, no cyanosis or edema  Musculoskeletal: steady gait  Psych:  Appropriate affect and speech    Results:     Prenatal Results       Initial       Test Value Reference Range Date Time    Blood Type (ABO Group) ^ A   03/15/23     Rh Factor ^ Positive   03/15/23     Antibody Screen (Required questions in OE to answer) ^ Negative   03/15/23     HCT ^ 38.6   03/15/23     HGB ^ 12. 7   03/15/23     MCV ^ 80. 5   03/15/23     Platelets ^ 680   82/97/23     Rubella ^ immune   03/15/23     TREP        RPR ([facility])        Urine Culture        Hepatitis B ^ Non-reactive   03/15/23     HIV Combo ^ non-reactive   03/15/23     HCV                  Optional Initial Labs       Test Value Reference Range Date Time    TSH        Pap Smear        HPV        GC DNA        Chlamydia DNA        GTT 1 Hr        Glucose Fasting        Glucose 1 Hr        Glucose 2 Hr        Glucose 3 Hr HgB A1c        Vitamin D                  8-20 Weeks       Test Value Reference Range Date Time    1st Trimester Aneuploidy Risk Assessment        Quad - Down Screen Risk Estimate - prior to 18        Quad - Down Maternal Age Risk - prior to 18        Quad - Trisomy 18 screen Risk Estimate - prior to 18        AFP Spina Bifida (Required questions in OE to answer )        QUAD/AFP - Interpretation        Free Fetal DNA         Genetic testing        Genetic testing        Genetic testing        GC DNA ^ Not Detected   03/15/23     Chlamydia DNA ^ Not detected   03/15/23               24-28 Weeks       Test Value Reference Range Date Time    HCT        HGB        Platelets        GTT 1 Hr ^ 101   06/15/23     Glucose Fasting        Glucose 1 Hr        Glucose 2 Hr        Glucose 3 Hr        TSH         Profile        Urine Culture        GC DNA        Chlamydia DNA                  35-37 Weeks       Test Value Reference Range Date Time    HCT        HGB        Platelets        TREP        RPR ([facility])        Genital Group B Culture  No Beta Hemolytic Strep Group B Isolated.    23 0922    TSH        Urine Culture  No Growth at 18-24 hrs.   23 1444       No Growth 2 Days   23 0922    HIV Combo ^ Non reactive   06/15/23     GC DNA  Negative  Negative 23 1444       Negative  Negative 23 0922    Chlamydia DNA  Negative  Negative 23 1444       Negative  Negative 23 0922              Optional Labs       Test Value Reference Range Date Time    HgB A1c        HGB Electrophoresis        Varicella Zoster        Cystic Fibrosis-Old         Cystic Fibrosis[32] (Required questions in OE to answer)        Cystic Fibrosis[165] (Required questions in OE to answer)        Cystic Fibrosis[165] (Required questions in OE to answer)        Cystic Fibrosis[165] (Required questions in OE to answer)        Sickle Cell        24Hr Urine Protein        24Hr Urine Creatinine Parvo B19 IgM        Parvo B19 IgG                  Legend    ^: Historical                            Reviewed all prenatal ultrasounds    Admit labs pending    Assessment/Plan:   Millicent Wang is 21year old and  with current gestational age of 44w2d and estimated due date of 2023 by 15wga ultrasound. Early latent labor. Category 1 FHR tracing  Obstetrical history significant for  prior LTCS . Admission problem(s):    Term pregnancy  In spontaneous latent labor  Plans epidural and may have when desires      Previous  section complicating pregnancy  Documented LTCS  Desires TOLAC  S/P MD consult and Dr Polk Organ notified of patient status upon admission      Risks, benefits, alternatives and possible complications have been discussed in detail with the patient. Pre-admission, admission, and post admission procedures and expectations were discussed in detail. All questions answered, all appropriate consents will be signed at the Hospital. Admission is planned for today. Anticipate vaginal delivery.     Moira Jaquez CNM  2023  10:11 PM  Caring for patient until 0700

## 2023-08-14 VITALS
TEMPERATURE: 98 F | DIASTOLIC BLOOD PRESSURE: 74 MMHG | WEIGHT: 172 LBS | HEART RATE: 79 BPM | SYSTOLIC BLOOD PRESSURE: 116 MMHG | BODY MASS INDEX: 30.48 KG/M2 | HEIGHT: 62.99 IN | RESPIRATION RATE: 16 BRPM | OXYGEN SATURATION: 98 %

## 2023-08-14 PROBLEM — Z34.90 TERM PREGNANCY (HCC): Status: RESOLVED | Noted: 2023-08-12 | Resolved: 2023-08-14

## 2023-08-14 PROBLEM — Z34.90 TERM PREGNANCY: Status: RESOLVED | Noted: 2023-08-12 | Resolved: 2023-08-14

## 2023-08-14 LAB — T PALLIDUM AB SER QL: NEGATIVE

## 2023-08-14 RX ORDER — IBUPROFEN 600 MG/1
600 TABLET ORAL EVERY 6 HOURS PRN
Qty: 40 TABLET | Refills: 0 | Status: SHIPPED | OUTPATIENT
Start: 2023-08-14 | End: 2023-08-24

## 2023-08-14 RX ORDER — PSEUDOEPHEDRINE HCL 30 MG
100 TABLET ORAL 2 TIMES DAILY PRN
Qty: 20 CAPSULE | Refills: 0 | Status: SHIPPED | OUTPATIENT
Start: 2023-08-14 | End: 2023-08-24

## 2023-08-14 NOTE — LACTATION NOTE
This note was copied from a baby's chart. 08/14/23 1130   Evaluation Type   Evaluation Type Inpatient   Problems & Assessment   Problems Diagnosed or Identified Latch difficulty;Sleepy   Infant Assessment Minimal hunger cues present   Muscle tone Appropriate for GA   Feeding Assessment   Summary Current Feeding Adlib;Breastfeeding exclusively; Infant not latching to breast   Last 24 hour feeding summary see I&O   Breastfeeding Assessment Assisted with breastfeeding w/mother's permission; No sustained latch to breast;Sleepy infant, quickly pacifies; Nipple shield initiated with non-nutritive suckling   Breastfeeding Positions football;laid back;right breast;left breast   Latch 0   Audible Sucks/Swallows 0   Type of Nipple 2   Comfort (Breast/Nipple) 2   Hold (Positioning) 1   LATCH Score 5   Output   # Voids in 24 hours see I&O   Equipment used   Equipment used Nipple Shield   Nipple shield size 20 mm

## 2023-08-14 NOTE — ANESTHESIA POSTPROCEDURE EVALUATION
Patient: Jose Antonio Melo    Procedure Summary       Date: 08/13/23 Room / Location:     Anesthesia Start: 0158 Anesthesia Stop: 0170    Procedure: LABOR ANALGESIA Diagnosis:     Scheduled Providers:  Anesthesiologist: Molly Lezama MD    Anesthesia Type: epidural ASA Status: 2 - Emergent            Anesthesia Type: epidural    Vitals Value Taken Time   /71 08/13/23 1118   Temp  08/13/23 1931   Pulse 79 08/13/23 1118   Resp  08/13/23 1931   SpO2 99 % 08/13/23 1115   Vitals shown include unvalidated device data.     [facility] Evaluation:   Patient Evaluated in floor  Patient Participation: complete - patient participated  Level of Consciousness: awake and alert  Pain Management: adequate  Airway Patency:patent  Dental exam unchanged from preop  Yes    Cardiovascular Status: acceptable  Respiratory Status: acceptable  Postoperative Hydration acceptable      Ewa Parker MD  8/13/2023 7:31 PM

## 2023-08-14 NOTE — DISCHARGE PLANNING
Discharge home in wheelchair with baby and father of baby. AVS provided with discharge instructions.  Pt verbalizes understanding and follow up arranged

## 2023-08-14 NOTE — LACTATION NOTE
08/14/23 1130   Evaluation Type   Evaluation Type Inpatient   Problems identified   Problems identified Knowledge deficit   Breastfeeding goal   Breastfeeding goal To maintain breast milk feeding per patient goal   Maternal Assessment   Bilateral Breasts Symmetrical;Soft   Bilateral Nipples Colostrum easily expressed; Everted   Right Breast Soft;Symmetrical   Prior breastfeeding experience (comment below) Multip   Prior BF experience: comment did not breast feed 1st baby   Breastfeeding Assistance Breastfeeding assistance provided with permission   Pain assessment   Pain, additional Pain w/initial sucks only;Pain w/pumping   Treatment of Sore Nipples Deeper latch techniques; Expressed breast milk; Lanolin   Guidelines for use of:   Equipment Nipple shield; Lanolin   Breast pump type Hand Pump

## 2023-08-14 NOTE — DISCHARGE SUMMARY
[facility]    Discharge Summary    Boom Singh Patient Status:  Inpatient    2000 MRN [id number]   Location [facility] 3SE Attending Nereida Dunn, [address] Day # 2       Delivering OB Clinician: Feliz Valdivia CNM    [facility]: Estimated Date of Delivery: 23    Gestational Age: 38w3d    Antepartum complications: Patient Active Problem List:     Previous  section complicating pregnancy     Vaginal birth after       Date of Delivery: 2023 Time of Delivery: 10:13 AM    Delivery Type: vaginal birth after  ()    Baby: Liveborn male Information for the patient's : Olayinka Starkey [[id number]]   7 lb 2.3 oz (3.24 kg)  Apgars:  1 minute: 8  5 minutes: 910 minutes:       Intrapartum Complications: None    Admit Date: 2023    Discharge Date: 2023    Hospital Course: No complications Routine delivery and postpartum care    Discharged Condition: stable    Disposition: home    Plan:     Follow-up appointment in 2 weeks and 6 weeks with BETITO iKran CNM  2023  11:06 AM

## 2023-08-14 NOTE — PLAN OF CARE
Problem: Patient Centered Care  Goal: Patient preferences are identified and integrated in the patient's plan of care  Description: Interventions:  - What would you like us to know as we care for you? This is my second baby  - Provide timely, complete, and accurate information to patient/family  - Incorporate patient and family knowledge, values, beliefs, and cultural backgrounds into the planning and delivery of care  - Encourage patient/family to participate in care and decision-making at the level they choose  - Honor patient and family perspectives and choices  Outcome: Adequate for Discharge     Problem: Patient/Family Goals  Goal: Patient/Family Long Term Goal  Description: Patient's Long Term Goal: to go home today early    Interventions:  - See additional Care Plan goals for specific interventions  Outcome: Adequate for Discharge  Goal: Patient/Family Short Term Goal  Description: Patient's Short Term Goal: confidence in breastfeeding baby    Interventions:  - See additional Care Plan goals for specific interventions  Outcome: Adequate for Discharge     Problem: POSTPARTUM  Goal: Long Term Goal:Experiences normal postpartum course  Description: INTERVENTIONS:  - Assess and monitor vital signs and lab values. - Assess fundus and lochia. - Provide ice/sitz baths for perineum discomfort. - Monitor healing of incision/episiotomy/laceration, and assess for signs and symptoms of infection and hematoma. - Assess bladder function and monitor for bladder distention.  - Provide/instruct/assist with pericare as needed. - Provide VTE prophylaxis as needed. - Monitor bowel function.  - Encourage ambulation and provide assistance as needed. - Assess and monitor emotional status and provide social service/psych resources as needed. - Utilize standard precautions and use personal protective equipment as indicated.  Ensure aseptic care of all intravenous lines and invasive tubes/drains.  - Obtain immunization and exposure to communicable diseases history. Outcome: Adequate for Discharge  Goal: Optimize infant feeding at the breast  Description: INTERVENTIONS:  - Initiate breast feeding within first hour after birth. - Monitor effectiveness of current breast feeding efforts. - Assess support systems available to mother/family.  - Identify cultural beliefs/practices regarding lactation, letdown techniques, maternal food preferences. - Assess mother's knowledge and previous experience with breast feeding.  - Provide information as needed about early infant feeding cues (e.g., rooting, lip smacking, sucking fingers/hand) versus late cue of crying.  - Discuss/demonstrate breast feeding aids (e.g., infant sling, nursing footstool/pillows, and breast pumps). - Encourage mother/other family members to express feelings/concerns, and actively listen. - Educate father/SO about benefits of breast feeding and how to manage common lactation challenges. - Recommend avoidance of specific medications or substances incompatible with breast feeding.  - Assess and monitor for signs of nipple pain/trauma. - Instruct and provide assistance with proper latch. - Review techniques for milk expression (breast pumping) and storage of breast milk. Provide pumping equipment/supplies, instructions and assistance, as needed. - Encourage rooming-in and breast feeding on demand.  - Encourage skin-to-skin contact. - Provide LC support as needed. - Assess for and manage engorgement. - Provide breast feeding education handouts and information on community breast feeding support. Outcome: Adequate for Discharge  Goal: Establishment of adequate milk supply with medication/procedure interruptions  Description: INTERVENTIONS:  - Review techniques for milk expression (breast pumping). - Provide pumping equipment/supplies, instructions, and assistance until it is safe to breastfeed infant.   Outcome: Adequate for Discharge  Goal: Experiences normal breast weaning course  Description: INTERVENTIONS:  - Assess for and manage engorgement. - Instruct on breast care. - Provide comfort measures. Outcome: Adequate for Discharge  Goal: Appropriate maternal -  bonding  Description: INTERVENTIONS:  - Assess caregiver- interactions. - Assess caregiver's emotional status and coping mechanisms. - Encourage caregiver to participate in  daily care. - Assess support systems available to mother/family.  - Provide /case management support as needed. Outcome: Adequate for Discharge   Pt requesting discharge home today. Order received.  Pain managed with ibuprofen as per protocol

## 2023-08-14 NOTE — PROGRESS NOTES
Desires circumcision possibly as outpatient. Updated bays after visit summary instructions for follow up with [facility]. Dr. Yannick Singh MD    [facility] 10 OBGYN     This note was created by [facility] voice recognition. Errors in content may be related to improper recognition by the system; efforts to review and correct have been done but errors may still exist. Please be advised the primary purpose of this note is for me to communicate medical care. Standard sentence structure is not always used. Medical terminology and medical abbreviations may be used. There may be grammatical, typographical, and automated fill ins that may have errors missed in proofreading.

## 2023-09-15 ENCOUNTER — TELEPHONE (OUTPATIENT)
Dept: OBGYN UNIT | Facility: HOSPITAL | Age: 23
End: 2023-09-15

## 2023-09-25 ENCOUNTER — POSTPARTUM (OUTPATIENT)
Dept: OBGYN CLINIC | Facility: CLINIC | Age: 23
End: 2023-09-25

## 2023-09-25 VITALS
SYSTOLIC BLOOD PRESSURE: 107 MMHG | WEIGHT: 157 LBS | DIASTOLIC BLOOD PRESSURE: 73 MMHG | HEIGHT: 63 IN | BODY MASS INDEX: 27.82 KG/M2

## 2023-09-25 DIAGNOSIS — Z30.013 ENCOUNTER FOR INITIAL PRESCRIPTION OF INJECTABLE CONTRACEPTIVE: ICD-10-CM

## 2023-09-25 DIAGNOSIS — Z23 FLU VACCINE NEED: ICD-10-CM

## 2023-09-25 LAB
CONTROL LINE PRESENT WITH A CLEAR BACKGROUND (YES/NO): YES YES/NO
KIT LOT #: NORMAL NUMERIC
PREGNANCY TEST, URINE: NEGATIVE

## 2023-09-25 RX ORDER — MEDROXYPROGESTERONE ACETATE 150 MG/ML
150 INJECTION, SUSPENSION INTRAMUSCULAR ONCE
Status: COMPLETED | OUTPATIENT
Start: 2023-09-25 | End: 2023-09-25

## 2023-09-25 RX ADMIN — MEDROXYPROGESTERONE ACETATE 150 MG: 150 INJECTION, SUSPENSION INTRAMUSCULAR at 11:06:00

## 2024-02-15 ENCOUNTER — OFFICE VISIT (OUTPATIENT)
Dept: OBGYN CLINIC | Facility: CLINIC | Age: 24
End: 2024-02-15

## 2024-02-15 VITALS
BODY MASS INDEX: 30.65 KG/M2 | HEIGHT: 63 IN | SYSTOLIC BLOOD PRESSURE: 110 MMHG | WEIGHT: 173 LBS | HEART RATE: 74 BPM | DIASTOLIC BLOOD PRESSURE: 76 MMHG

## 2024-02-15 DIAGNOSIS — Z11.3 SCREEN FOR STD (SEXUALLY TRANSMITTED DISEASE): ICD-10-CM

## 2024-02-15 DIAGNOSIS — Z63.8 STRESS DUE TO FAMILY TENSION: ICD-10-CM

## 2024-02-15 DIAGNOSIS — Z30.09 BIRTH CONTROL COUNSELING: ICD-10-CM

## 2024-02-15 DIAGNOSIS — Z01.419 WOMEN'S ANNUAL ROUTINE GYNECOLOGICAL EXAMINATION: Primary | ICD-10-CM

## 2024-02-15 DIAGNOSIS — N94.11 INTROITAL DYSPAREUNIA: ICD-10-CM

## 2024-02-15 DIAGNOSIS — N89.8 VAGINAL ODOR: ICD-10-CM

## 2024-02-15 PROCEDURE — 99395 PREV VISIT EST AGE 18-39: CPT | Performed by: ADVANCED PRACTICE MIDWIFE

## 2024-02-15 RX ORDER — ACETAMINOPHEN AND CODEINE PHOSPHATE 120; 12 MG/5ML; MG/5ML
0.35 SOLUTION ORAL DAILY
Qty: 84 TABLET | Refills: 3 | Status: SHIPPED | OUTPATIENT
Start: 2024-02-15 | End: 2025-02-14

## 2024-02-15 SDOH — SOCIAL STABILITY - SOCIAL INSECURITY: OTHER SPECIFIED PROBLEMS RELATED TO PRIMARY SUPPORT GROUP: Z63.8

## 2024-02-15 NOTE — PROGRESS NOTES
Chief Complaint:   Chief Complaint   Patient presents with    Annual     Annual physical, has not had a pap yet since giving birth, patient also noticed more discharge and odor       HPI:     Tabatha is 24 year old female, here today for her annual check up and to discuss some concerns.  Patient's last menstrual period was 02/15/2024 (exact date).. Menses irregular, lasting 5 days.      Reports depo caused her to gain weight. Also experiencing a lot of hair loss which she is not sure is from depo or just postpartum hair loss. Feels hormones really affect her moods. Has a lot of emotional lability. Feels whenever she is on hormonal BC its worse. Admits to having a lot of family stress which she knows is likely contributing to her moods. Denies any SI or HI.   Reports vaginal odor and increased vaginal discharge for the past few years.   Has pain with sex where her stiches were. This has contributed to low libido and not having a desire for sex since its painful.    Current Partners: Long term boyfriend, monogamous  Birth Control Method: Depo at 6w PP visit, since then has not been sexually active very much but when she is they pull out. Wants to discuss birth control options.  H/O of STI's: Denies  Last STI screen: Desires this testing today  Additional information:      Last Pap: Has never had one      H/O abnormal Pap: n/a      Last mammogram (if applicable): n/a; denies family hx or breast concerns      HISTORY:  History reviewed. No pertinent past medical history.   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   Tobacco Use    Smoking status: Never    Smokeless tobacco: Never   Vaping Use    Vaping Use: Never used   Substance and Sexual Activity    Alcohol use: Not Currently    Drug use: Never   Other Topics Concern     Service No    Blood Transfusions No    Caffeine Concern No    Special Diet No    Exercise No    Seat Belt Yes   Social History Narrative    Lives with waleska Quintanilla     Social Determinants of Health     Financial Resource Strain: Low Risk  (2023)    Financial Resource Strain     Difficulty of Paying Living Expenses: Not hard at all     Med Affordability: No   Food Insecurity: No Food Insecurity (2023)    Food Insecurity     Food Insecurity: Never true   Transportation Needs: No Transportation Needs (2023)    Transportation Needs     Lack of Transportation: No   Stress: No Stress Concern Present (2023)    Stress     Feeling of Stress : No   Housing Stability: Low Risk  (2023)    Housing Stability     Housing Instability: No        Medications (Active prior to today's visit):  Current Outpatient Medications   Medication Sig Dispense Refill    Norethindrone (ORTHO MICRONOR) 0.35 MG Oral Tab Take 1 tablet (0.35 mg total) by mouth daily. 84 tablet 3    Prenatal Vit-Fe Fumarate-FA (WESTAB PLUS) 27-1 MG Oral Tab Take 1 tablet by mouth daily. (Patient not taking: Reported on 2/15/2024)         Allergies:  No Known Allergies    HISTORY:  History reviewed. No pertinent past medical history.   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   Tobacco Use    Smoking status: Never    Smokeless tobacco: Never   Vaping Use    Vaping Use: Never used   Substance and Sexual Activity    Alcohol use: Not Currently    Drug use: Never   Other Topics Concern     Service No    Blood Transfusions No    Caffeine Concern No    Special Diet No    Exercise No    Seat Belt Yes   Social History Narrative    Lives with waleska Quintanilla     Social Determinants of Health     Financial Resource Strain: Low Risk  (8/12/2023)    Financial Resource Strain     Difficulty of Paying Living Expenses: Not hard at all     Med Affordability: No   Food Insecurity: No Food Insecurity (8/12/2023)    Food Insecurity     Food Insecurity: Never true   Transportation Needs: No Transportation Needs (8/12/2023)    Transportation Needs     Lack of Transportation: No   Stress: No Stress Concern Present (8/12/2023)    Stress     Feeling of Stress : No   Housing Stability: Low Risk  (8/12/2023)    Housing Stability     Housing Instability: No        Medications (Active prior to today's visit):  Current Outpatient Medications   Medication Sig Dispense Refill    Norethindrone (ORTHO MICRONOR) 0.35 MG Oral Tab Take 1 tablet (0.35 mg total) by mouth daily. 84 tablet 3    Prenatal Vit-Fe Fumarate-FA (WESTAB PLUS) 27-1 MG Oral Tab Take 1 tablet by mouth daily. (Patient not taking: Reported on 2/15/2024)         Allergies:  No Known Allergies      ROS:   Review of Systems   Constitutional: Negative.    Respiratory: Negative.     Cardiovascular: Negative.    Gastrointestinal: Negative.    Genitourinary:  Positive for dyspareunia and vaginal discharge. Negative for difficulty urinating, dysuria, frequency, genital sores, hematuria, menstrual problem, pelvic pain, urgency, vaginal bleeding and vaginal pain.   Neurological: Negative.    Psychiatric/Behavioral: Negative.         Denies having  little interest in activities in past month  Denies being bothered by feeling down, depressed or hopeless in the last month  Denies fibroids, ovarian cysts, PID, infertility or any other gynecologic problems  Denies painful intercourse, decreased libido or sexual dysfunction.  Denies history of rape or childhood sexual abuse.  Denies being hit, kicked, punched or otherwise hurt by someone in the past year.  Feels safe in her current relationship. Denies partner from a previous relationship is making her feel unsafe  PHYSICAL EXAM:     Vitals:    02/15/24 1447   BP: 110/76   Pulse: 74     Physical Exam  Vitals and nursing note reviewed.   Constitutional:       General: She is not in acute distress.     Appearance: Normal appearance. She is not ill-appearing.   HENT:      Head: Normocephalic and atraumatic.   Cardiovascular:      Rate and Rhythm: Normal rate.   Pulmonary:      Effort: Pulmonary effort is normal. No respiratory distress.   Genitourinary:     General: Normal vulva.      Exam position: Lithotomy position.      Labia:         Right: No rash, tenderness, lesion or injury.         Left: No rash, tenderness, lesion or injury.       Urethra: No prolapse, urethral pain, urethral swelling or urethral lesion.      Vagina: Normal. No vaginal discharge, tenderness or lesions.      Cervix: No cervical motion tenderness, discharge, friability, lesion or erythema.      Comments: Pt with pain on initial insertion of speculum and with palpation of inferior introitus  Skin:     General: Skin is warm and dry.   Neurological:      Mental Status: She is alert and oriented to person, place, and time.   Psychiatric:         Mood and Affect: Mood normal.         Behavior: Behavior normal.         Thought Content: Thought content normal.         Judgment: Judgment normal.        Breast exam: deferred due to patient under age 24yo and without concerns or contributory history      ASSESSMENT/PLAN:        Diagnoses and all orders  for this visit:    Women's annual routine gynecological examination  -     ThinPrep PAP Smear B; Future    Screen for STD (sexually transmitted disease)  -     Chlamydia/Gc Amplification; Future    Stress due to family tension  -     Neeraj Gibson    Vaginal odor  -     Vaginitis Vaginosis PCR Panel; Future    Birth control counseling  -     Norethindrone (ORTHO MICRONOR) 0.35 MG Oral Tab; Take 1 tablet (0.35 mg total) by mouth daily.  - Contraceptive options reviewed. Pt desires mini pill. Discussed usage and importance of taking at the same time every day. Back up method for the first 7 days.     Introital dyspareunia  -     Pelvic Floor Therapy - [place] [facility]           Pt was informed that the portal is not to be considered for emergency communication as it is not monitored 24/7. Informed, instead, to call the clinic and the afterhours answering service will page the provider on-call. Pt voiced understanding and agreed    Counseling:  Best hygiene practices  Contraceptive method(s), STI and HIV risk reduction/condom use  Emergency contraception reviewed  Frequency of health screening and personal risks  Pap, SBE/CBE, mammography  Benefits of daily vitamin D, folic acid, and adequate fresh fruits and vegetables  Benefits of daily exercise     2/15/2024  June Carranza CNM

## 2024-02-16 LAB
BV BACTERIA DNA VAG QL NAA+PROBE: POSITIVE
C GLABRATA DNA VAG QL NAA+PROBE: NEGATIVE
C KRUSEI DNA VAG QL NAA+PROBE: NEGATIVE
C TRACH DNA SPEC QL NAA+PROBE: NEGATIVE
CANDIDA DNA VAG QL NAA+PROBE: NEGATIVE
N GONORRHOEA DNA SPEC QL NAA+PROBE: NEGATIVE
T VAGINALIS DNA VAG QL NAA+PROBE: NEGATIVE

## 2024-02-16 RX ORDER — METRONIDAZOLE 500 MG/1
500 TABLET ORAL 2 TIMES DAILY
Qty: 14 TABLET | Refills: 0 | Status: SHIPPED | OUTPATIENT
Start: 2024-02-16 | End: 2024-02-23

## 2024-02-23 ENCOUNTER — TELEPHONE (OUTPATIENT)
Age: 24
End: 2024-02-23

## 2024-03-01 ENCOUNTER — TELEPHONE (OUTPATIENT)
Age: 24
End: 2024-03-01

## 2024-07-30 ENCOUNTER — HOSPITAL ENCOUNTER (OUTPATIENT)
Age: 24
Discharge: ANOTHER HOSPITAL | End: 2024-07-30
Payer: MEDICAID

## 2024-07-30 ENCOUNTER — HOSPITAL ENCOUNTER (EMERGENCY)
Facility: HOSPITAL | Age: 24
Discharge: HOME | End: 2024-07-31
Attending: EMERGENCY MEDICINE
Payer: MEDICAID

## 2024-07-30 ENCOUNTER — APPOINTMENT (OUTPATIENT)
Dept: ULTRASOUND IMAGING | Facility: HOSPITAL | Age: 24
End: 2024-07-30
Attending: EMERGENCY MEDICINE
Payer: MEDICAID

## 2024-07-30 VITALS
WEIGHT: 169 LBS | SYSTOLIC BLOOD PRESSURE: 115 MMHG | HEIGHT: 62 IN | OXYGEN SATURATION: 100 % | BODY MASS INDEX: 31.1 KG/M2 | RESPIRATION RATE: 18 BRPM | TEMPERATURE: 98 F | HEART RATE: 75 BPM | DIASTOLIC BLOOD PRESSURE: 75 MMHG

## 2024-07-30 VITALS
HEART RATE: 75 BPM | DIASTOLIC BLOOD PRESSURE: 71 MMHG | SYSTOLIC BLOOD PRESSURE: 124 MMHG | RESPIRATION RATE: 16 BRPM | TEMPERATURE: 98 F | OXYGEN SATURATION: 99 %

## 2024-07-30 DIAGNOSIS — R10.2 PELVIC PAIN: Primary | ICD-10-CM

## 2024-07-30 DIAGNOSIS — N76.0 VAGINOSIS: Primary | ICD-10-CM

## 2024-07-30 LAB
ALBUMIN SERPL-MCNC: 4.8 G/DL (ref 3.2–4.8)
ALP LIVER SERPL-CCNC: 75 U/L
ALT SERPL-CCNC: 16 U/L
ANION GAP SERPL CALC-SCNC: 6 MMOL/L (ref 0–18)
AST SERPL-CCNC: 22 U/L (ref ?–34)
B-HCG UR QL: NEGATIVE
B-HCG UR QL: NEGATIVE
BASOPHILS # BLD AUTO: 0.03 X10(3) UL (ref 0–0.2)
BASOPHILS NFR BLD AUTO: 0.4 %
BILIRUB DIRECT SERPL-MCNC: 0.1 MG/DL (ref ?–0.3)
BILIRUB SERPL-MCNC: 0.5 MG/DL (ref 0.3–1.2)
BILIRUB UR QL STRIP: NEGATIVE
BILIRUB UR QL: NEGATIVE
BUN BLD-MCNC: 14 MG/DL (ref 9–23)
BUN/CREAT SERPL: 16.7 (ref 10–20)
CALCIUM BLD-MCNC: 9.9 MG/DL (ref 8.7–10.4)
CHLORIDE SERPL-SCNC: 105 MMOL/L (ref 98–112)
CLARITY UR: CLEAR
CLARITY UR: CLEAR
CO2 SERPL-SCNC: 26 MMOL/L (ref 21–32)
COLOR UR: YELLOW
CREAT BLD-MCNC: 0.84 MG/DL
DEPRECATED RDW RBC AUTO: 42.5 FL (ref 35.1–46.3)
EGFRCR SERPLBLD CKD-EPI 2021: 99 ML/MIN/1.73M2 (ref 60–?)
EOSINOPHIL # BLD AUTO: 0.25 X10(3) UL (ref 0–0.7)
EOSINOPHIL NFR BLD AUTO: 3.1 %
ERYTHROCYTE [DISTWIDTH] IN BLOOD BY AUTOMATED COUNT: 13.2 % (ref 11–15)
GLUCOSE BLD-MCNC: 81 MG/DL (ref 70–99)
GLUCOSE UR STRIP-MCNC: NEGATIVE MG/DL
GLUCOSE UR-MCNC: NORMAL MG/DL
HCT VFR BLD AUTO: 42.1 %
HGB BLD-MCNC: 13.4 G/DL
HGB UR QL STRIP.AUTO: NEGATIVE
HGB UR QL STRIP: NEGATIVE
IMM GRANULOCYTES # BLD AUTO: 0.01 X10(3) UL (ref 0–1)
IMM GRANULOCYTES NFR BLD: 0.1 %
KETONES UR-MCNC: 20 MG/DL
LEUKOCYTE ESTERASE UR QL STRIP.AUTO: NEGATIVE
LEUKOCYTE ESTERASE UR QL STRIP: NEGATIVE
LIPASE SERPL-CCNC: 29 U/L (ref 12–53)
LYMPHOCYTES # BLD AUTO: 3.13 X10(3) UL (ref 1–4)
LYMPHOCYTES NFR BLD AUTO: 39.1 %
MCH RBC QN AUTO: 28 PG (ref 26–34)
MCHC RBC AUTO-ENTMCNC: 31.8 G/DL (ref 31–37)
MCV RBC AUTO: 87.9 FL
MONOCYTES # BLD AUTO: 0.61 X10(3) UL (ref 0.1–1)
MONOCYTES NFR BLD AUTO: 7.6 %
NEUTROPHILS # BLD AUTO: 3.98 X10 (3) UL (ref 1.5–7.7)
NEUTROPHILS # BLD AUTO: 3.98 X10(3) UL (ref 1.5–7.7)
NEUTROPHILS NFR BLD AUTO: 49.7 %
NITRITE UR QL STRIP.AUTO: NEGATIVE
NITRITE UR QL STRIP: NEGATIVE
OSMOLALITY SERPL CALC.SUM OF ELEC: 284 MOSM/KG (ref 275–295)
PH UR STRIP: 6.5 [PH]
PH UR: 6 [PH] (ref 5–8)
PLATELET # BLD AUTO: 294 10(3)UL (ref 150–450)
POTASSIUM SERPL-SCNC: 4 MMOL/L (ref 3.5–5.1)
PROT SERPL-MCNC: 7.6 G/DL (ref 5.7–8.2)
PROT UR STRIP-MCNC: NEGATIVE MG/DL
PROT UR-MCNC: NEGATIVE MG/DL
RBC # BLD AUTO: 4.79 X10(6)UL
SODIUM SERPL-SCNC: 137 MMOL/L (ref 136–145)
SP GR UR STRIP: 1.02
SP GR UR STRIP: 1.03 (ref 1–1.03)
UROBILINOGEN UR STRIP-ACNC: <2 MG/DL
UROBILINOGEN UR STRIP-ACNC: NORMAL
WBC # BLD AUTO: 8 X10(3) UL (ref 4–11)

## 2024-07-30 PROCEDURE — 81025 URINE PREGNANCY TEST: CPT | Performed by: NURSE PRACTITIONER

## 2024-07-30 PROCEDURE — 93975 VASCULAR STUDY: CPT | Performed by: EMERGENCY MEDICINE

## 2024-07-30 PROCEDURE — 99284 EMERGENCY DEPT VISIT MOD MDM: CPT

## 2024-07-30 PROCEDURE — 96360 HYDRATION IV INFUSION INIT: CPT

## 2024-07-30 PROCEDURE — 76856 US EXAM PELVIC COMPLETE: CPT | Performed by: EMERGENCY MEDICINE

## 2024-07-30 PROCEDURE — 81002 URINALYSIS NONAUTO W/O SCOPE: CPT | Performed by: NURSE PRACTITIONER

## 2024-07-30 PROCEDURE — 81025 URINE PREGNANCY TEST: CPT

## 2024-07-30 PROCEDURE — 85025 COMPLETE CBC W/AUTO DIFF WBC: CPT | Performed by: EMERGENCY MEDICINE

## 2024-07-30 PROCEDURE — 81003 URINALYSIS AUTO W/O SCOPE: CPT | Performed by: EMERGENCY MEDICINE

## 2024-07-30 PROCEDURE — 83690 ASSAY OF LIPASE: CPT | Performed by: EMERGENCY MEDICINE

## 2024-07-30 PROCEDURE — 99215 OFFICE O/P EST HI 40 MIN: CPT | Performed by: NURSE PRACTITIONER

## 2024-07-30 PROCEDURE — 80048 BASIC METABOLIC PNL TOTAL CA: CPT | Performed by: EMERGENCY MEDICINE

## 2024-07-30 PROCEDURE — 76830 TRANSVAGINAL US NON-OB: CPT | Performed by: EMERGENCY MEDICINE

## 2024-07-30 PROCEDURE — 80076 HEPATIC FUNCTION PANEL: CPT | Performed by: EMERGENCY MEDICINE

## 2024-07-30 RX ORDER — IBUPROFEN 600 MG/1
800 TABLET ORAL EVERY 6 HOURS PRN
COMMUNITY

## 2024-07-30 NOTE — ED PROVIDER NOTES
Patient Seen in: [facility]      History     Chief Complaint   Patient presents with    Abdominal Pain     Stated Complaint: lower abd pain    Subjective: This is a 24-year-old female, , presents to immediate care clinic with bilateral lower pelvic pain and cramping, left worse than right.  Patient states symptoms have been ongoing for about 1 to 2 weeks and initially were intermittent in nature.  Pain became more severe and constant today.  Currently rates pain 9 out of 10.  She also states positive low back pain.  She has a history of ovarian cyst rupture 5 years ago and states this kind of feels somewhat similar, however, notes that this feels like \"contractions\".  Patient has not had her period since April.  She states prior to April she was very routine.  She is not on any oral contraceptives.  No IUD.  Last birth was in 2023, vaginal delivery.  Has since had normal/routine menses.  She took 2 pregnancy test prior to arrival which were negative.  She has no urinary symptoms of: Pain, burning, frequency, retention, hematuria.  No vaginal bleeding, discharge, odor.  She denies any nausea or vomiting.  No fever or chills.  Well-appearing.  AOx4.  The history is provided by the patient.           Objective:   History reviewed. No pertinent past medical history.           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   Tobacco Use    Smoking status: Never    Smokeless tobacco: Never   Vaping Use    Vaping status: Never Used   Substance and Sexual Activity    Alcohol use: Not Currently    Drug use: Never   Other Topics Concern     Service No    Blood Transfusions No    Caffeine  Concern No    Special Diet No    Exercise No    Seat Belt Yes   Social History Narrative    Lives with waleska Quintanilla     Social Determinants of Health     Financial Resource Strain: Not on File (5/10/2024)    Received from [facility]    Financial Resource Strain     Financial Resource Strain: 0   Food Insecurity: Not on File (5/10/2024)    Received from [facility] [facility]    Food Insecurity     Food: 0   Transportation Needs: Not on File (5/10/2024)    Received from [facility]    Transportation Needs     Transportation: 0   Stress: Not on File (5/10/2024)    Received from [facility]    Stress     Stress: 0   Housing Stability: Not on File (5/10/2024)    Received from [employer]    Housing Stability     Housin              Review of Systems   Constitutional: Negative.  Negative for activity change, appetite change, chills, fatigue and fever.   Gastrointestinal:  Positive for abdominal pain. Negative for diarrhea, nausea and vomiting.   Genitourinary:  Positive for pelvic pain. Negative for decreased urine volume, difficulty urinating, dysuria, flank pain, hematuria, urgency, vaginal bleeding, vaginal discharge and vaginal pain.   Musculoskeletal:  Positive for back pain.   Skin: Negative.        Positive for stated Chief Complaint: Abdominal Pain    Other systems are as noted in HPI.  Constitutional and vital signs reviewed.      All other systems reviewed and negative except as noted above.    Physical Exam     ED Triage Vitals [24 1842]   /71   Pulse 75   Resp 16   Temp 98.2 °F (36.8 °C)   Temp src Oral   SpO2 99 %   O2 Device None (Room air)       Current Vitals:   Vital Signs  BP: 124/71  Pulse: 75  Resp: 16  Temp: 98.2 °F (36.8 °C)  Temp src: Oral    Oxygen Therapy  SpO2: 99 %  O2 Device: None (Room air)            Physical Exam  Constitutional:       General: She is not in acute distress.     Appearance: She is well-developed. She is not ill-appearing.   HENT:      Head: Normocephalic.      Mouth/Throat:      Mouth: Mucous  membranes are moist.   Eyes:      Extraocular Movements: Extraocular movements intact.      Pupils: Pupils are equal, round, and reactive to light.   Cardiovascular:      Rate and Rhythm: Normal rate and regular rhythm.      Heart sounds: Normal heart sounds.   Pulmonary:      Effort: Pulmonary effort is normal.      Breath sounds: Normal breath sounds.   Abdominal:      General: Abdomen is flat. Bowel sounds are normal.      Palpations: Abdomen is soft.      Tenderness: There is abdominal tenderness in the right lower quadrant and left lower quadrant. There is no guarding or rebound. Negative signs include McBurney's sign.      Hernia: No hernia is present.   Skin:     General: Skin is warm.      Capillary Refill: Capillary refill takes less than 2 seconds.   Neurological:      General: No focal deficit present.      Mental Status: She is alert and oriented to person, place, and time.               ED Course     Labs Reviewed   [facility] POCT URINALYSIS DIPSTICK - Abnormal; Notable for the following components:       Result Value    Ketone, Urine Trace (*)     All other components within normal limits   POCT PREGNANCY URINE - Normal                      MDM      Differentials considered include: Acute cystitis with hematuria, acute cystitis without hematuria, pregnancy, threatened miscarriage, ectopic pregnancy, ovarian torsion, ovarian cyst, PID.    Patient urine obtained e-care, negative for leukocytes, nitrates, blood.  No acute cystitis.    Patient urine pregnancy negative.    Patient very tender on exam to bilateral lower abdominal quadrants, left worse than right.  She does have a history of ovarian cyst rupture.  Patient rating pain 9 out of 10.  Due to limitations of immediate care and time of arrival, unable to perform any blood work or ultrasound imaging - unable to rule out ovarian torsion.  Recommendation is for patient to go to ER for higher level care and further evaluation.       Patient is agreeable to  self transport to ER.   Discussed cased, presentation, past medical history with supervising physician, Dr. Suero who agrees with higher level of care warranted.                                  Medical Decision Making      Disposition and Plan     Clinical Impression:  1. Pelvic pain         Disposition:  Ic to ed  7/30/2024  7:12 pm    Follow-up:  No follow-up provider specified.        Medications Prescribed:  Discharge Medication List as of 7/30/2024  7:13 PM

## 2024-07-30 NOTE — ED INITIAL ASSESSMENT (HPI)
Lower abd pain for one week with lower back pain.  Pt states no fever/v/d/ua symptoms.  Pt's last period was 04/2024.   Pt states she has taken 2 pregnancy tests that were negative.

## 2024-07-31 RX ORDER — METRONIDAZOLE 500 MG/1
500 TABLET ORAL 2 TIMES DAILY
Qty: 14 TABLET | Refills: 0 | Status: SHIPPED | OUTPATIENT
Start: 2024-07-31 | End: 2024-08-07

## 2024-07-31 RX ORDER — IBUPROFEN 600 MG/1
600 TABLET ORAL EVERY 8 HOURS PRN
Qty: 20 TABLET | Refills: 0 | Status: SHIPPED | OUTPATIENT
Start: 2024-07-31 | End: 2024-08-07

## 2024-07-31 NOTE — ED PROVIDER NOTES
Patient Seen in: [facility] Emergency Department    History   No chief complaint on file.      HPI    24-year-old female presents ER today with left lower abdominal pains been on for the past week.  Patient was seen in urgent care told her to come here to get an ultrasound and labs.  Patient has had similar pains in the past and was called for ovarian cyst.  Patient also having some whitish clear discharge as well.  No vaginal bleeding.  Asked about STDs.  No vomiting.  No chest pain or shortness of breath    History reviewed. History reviewed. No pertinent past medical history.    History reviewed.   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      Social History and Family History elements reviewed from today, pertinent positives to the presenting problem noted.    Physical Exam     ED Triage Vitals [24]   /77   Pulse 80   Resp 19   Temp 98.4 °F (36.9 °C)   Temp src Oral   SpO2 98 %   O2 Device None (Room air)       All measures to prevent infection transmission during my  interaction with the patient were taken. Handwashing was performed prior to and after the exam.  Stethoscope and any equipment used during my examination was cleaned with super sani-cloth germicidal wipes following the exam.     Physical Exam  Vitals and nursing note reviewed.   Cardiovascular:      Rate and Rhythm: Normal rate.      Pulses: Normal pulses.   Abdominal:      Palpations: Abdomen is soft.      Tenderness: There is abdominal tenderness.   Musculoskeletal:         General: Normal range of motion.   Skin:     General: Skin is warm and dry.   Neurological:      General: No focal deficit present.      Mental Status: She is alert.   Psychiatric:         Mood and Affect: Mood normal.         ED Course        Labs Reviewed   URINALYSIS WITH CULTURE REFLEX - Abnormal; Notable for the following components:       Result Value    Ketones Urine 20 (*)     All other components within normal limits   BASIC METABOLIC PANEL (8) - Normal   HEPATIC FUNCTION PANEL (7) - Normal   LIPASE - Normal   POCT PREGNANCY URINE - Normal   CBC WITH DIFFERENTIAL WITH PLATELET    Narrative:     The following orders were created for panel order CBC With Differential With Platelet.                  Procedure                               Abnormality         Status                                     ---------                               -----------         ------                                     CBC W/ DIFFERENTIAL[[id number]]                              Final result                                                 Please view results for these tests on the individual orders.   CBC W/ DIFFERENTIAL       As Interpreted by me    Imaging Results Available and Reviewed while in ED: No results found.  Preliminary Radiology Report  [facility], [facility]  [phone] - Phone    [facility]    NAME: TOMMIE DOMINGUEZ    DATE OF EXAM: 07/30/2024  Patient No:  [id number]  Physician:  CANDICE  Date of Birth:   2000    Past Medical History (entered by Technologist):  no prev, hx   Reason For Exam (entered by Technologist):  Left lower pelvic pain  Other Notes (entered by Technologist): no prev.  left sided pelvic pain x 1 week with worsening.  pt states no cycle since April,neg preg tests.  hx .      -ut wnl  -endo measured, contents appear moving  -bilat ovs seen with multiple follicles   -doppler flow identified bilat ovs  -trace ff post cds  -fluid endocervical canal  pt gave consent to TV exam    Additional Information (per [facility]):      Pelvic ultrasound    Comparison: None      IMPRESSION:    Uterus and endometrial stripe are unremarkable.  Endometrial stripe thickness is 1.1 cm.    Trace amount of fluid in the cervix.    Trace amount of cul-de-sac fluid.    Multiple follicles in the ovaries.  No dominant cyst is identified.    Doppler flow is seen in both ovaries.    Report sent at 12:26 AM Eastern time.        Ezra Michelle MD  This report has been electronically signed and verified by the Radiologist whose name is printed above.       This report contains privileged and confidential information and is intended solely for the use of the individual or entity to which it is addressed. If you are not the intended recipient of this report, you are hereby notified that any copying, distribution, dissemination or action taken in relation to the contents of this report is strictly prohibited and may be unlawful. If you have received this report in error, please notify the sender immediately at [phone] and permanently delete the original report and destroy any copies or printouts.    ED Medications Administered:   Medications   sodium chloride 0.9 % IV bolus 1,000 mL (0 mL Intravenous Stopped 248)         MDM     Vitals:    24 2056 24 2126 24 2156 24 2226   BP: 120/75 121/76 114/73 115/75   Pulse: 70 73 65 75   Resp: 18 19 18 18   Temp:       TempSrc:        SpO2: 99% 99% 99% 100%   Weight:       Height:         *I personally reviewed and interpreted all ED vitals.    Pulse Ox: 98%, Room air, Normal     Monitor Interpretation:   normal sinus rhythm as interpreted by me.  The cardiac monitor was ordered to monitor cardiac rate and rhythm.    Differential Diagnosis/ Diagnostic Considerations: Vaginitis, cervicitis, ovarian cyst    Complicating Factors: The patient already has does not have any pertinent problems on file. to contribute to the complexity of this ED evaluation.    Medical Decision Making    I reviewed all results with patient and family member at the bedside.  I reviewed labs and there is nothing acute on labs.  I reviewed ultrasound report and explained to them there is nothing acute on the ultrasound as well.  Due to the patient having this clearish white vaginal discharge most likely bacterial vaginosis.  This most likely cause of her pain since the rest of the workup did not show anything.  Will discharge home with prescription for Flagyl.  Patient understands to follow-up with her primary care provider 1 to 2 days.  Return to ER if some continue, get worse, unable to follow-up  Condition upon leaving the department: Stable    Disposition and Plan     Clinical Impression:  1. Vaginosis        Disposition:  Discharge    Follow-up:  Alexa Odonnell MD  [address] [place] [address]  [phone]    Follow up        Medications Prescribed:  Current Discharge Medication List        START taking these medications    Details   metRONIDAZOLE 500 MG Oral Tab Take 1 tablet (500 mg total) by mouth in the morning and 1 tablet (500 mg total) before bedtime. Do all this for 7 days.  Qty: 14 tablet, Refills: 0      !! ibuprofen 600 MG Oral Tab Take 1 tablet (600 mg total) by mouth every 8 (eight) hours as needed for Pain or Fever.  Qty: 20 tablet, Refills: 0       !! - Potential duplicate medications found. Please discuss with provider.

## 2024-07-31 NOTE — ED INITIAL ASSESSMENT (HPI)
Patient ambulatory for left lower abdominal pain radiating to back. Denies fever, chills, body aches or urinary symptoms. Patient alert and oriented x4 and in no active distress.

## 2024-07-31 NOTE — ED QUICK NOTES
Pt endorses initially intermittent bilateral lower pelvic pain and cramping, left worse than right x 1-2 weeks. Pt states now becoming more consistent. Also endorses lower back pain as well.    Denies N/V/D    Pt on stretcher, on monitor, respirations even and unlabored, family @ bedside

## 2024-12-25 ENCOUNTER — APPOINTMENT (OUTPATIENT)
Dept: CT IMAGING | Facility: HOSPITAL | Age: 24
End: 2024-12-25
Attending: EMERGENCY MEDICINE

## 2024-12-25 ENCOUNTER — HOSPITAL ENCOUNTER (EMERGENCY)
Facility: HOSPITAL | Age: 24
Discharge: HOME | End: 2024-12-25
Attending: EMERGENCY MEDICINE

## 2024-12-25 VITALS
TEMPERATURE: 98 F | DIASTOLIC BLOOD PRESSURE: 75 MMHG | HEIGHT: 62 IN | HEART RATE: 59 BPM | BODY MASS INDEX: 27.79 KG/M2 | SYSTOLIC BLOOD PRESSURE: 112 MMHG | OXYGEN SATURATION: 99 % | RESPIRATION RATE: 18 BRPM | WEIGHT: 151 LBS

## 2024-12-25 DIAGNOSIS — K52.9 ENTERITIS: Primary | ICD-10-CM

## 2024-12-25 DIAGNOSIS — K52.9 COLITIS: ICD-10-CM

## 2024-12-25 LAB
ALBUMIN SERPL-MCNC: 4.4 G/DL (ref 3.2–4.8)
ALBUMIN/GLOB SERPL: 1.7 {RATIO} (ref 1–2)
ALP LIVER SERPL-CCNC: 50 U/L
ALT SERPL-CCNC: 17 U/L
ANION GAP SERPL CALC-SCNC: 6 MMOL/L (ref 0–18)
AST SERPL-CCNC: 19 U/L (ref ?–34)
B-HCG UR QL: NEGATIVE
BASOPHILS # BLD AUTO: 0.02 X10(3) UL (ref 0–0.2)
BASOPHILS NFR BLD AUTO: 0.4 %
BILIRUB SERPL-MCNC: 0.3 MG/DL (ref 0.3–1.2)
BUN BLD-MCNC: 11 MG/DL (ref 9–23)
BUN/CREAT SERPL: 12.6 (ref 10–20)
CALCIUM BLD-MCNC: 9.4 MG/DL (ref 8.7–10.4)
CHLORIDE SERPL-SCNC: 109 MMOL/L (ref 98–112)
CO2 SERPL-SCNC: 27 MMOL/L (ref 21–32)
CREAT BLD-MCNC: 0.87 MG/DL
DEPRECATED RDW RBC AUTO: 41.1 FL (ref 35.1–46.3)
EGFRCR SERPLBLD CKD-EPI 2021: 95 ML/MIN/1.73M2 (ref 60–?)
EOSINOPHIL # BLD AUTO: 0.15 X10(3) UL (ref 0–0.7)
EOSINOPHIL NFR BLD AUTO: 3 %
ERYTHROCYTE [DISTWIDTH] IN BLOOD BY AUTOMATED COUNT: 12.5 % (ref 11–15)
GLOBULIN PLAS-MCNC: 2.6 G/DL (ref 2–3.5)
GLUCOSE BLD-MCNC: 90 MG/DL (ref 70–99)
HCT VFR BLD AUTO: 43.1 %
HGB BLD-MCNC: 14.3 G/DL
IMM GRANULOCYTES # BLD AUTO: 0 X10(3) UL (ref 0–1)
IMM GRANULOCYTES NFR BLD: 0 %
LIPASE SERPL-CCNC: 23 U/L (ref 12–53)
LYMPHOCYTES # BLD AUTO: 2.08 X10(3) UL (ref 1–4)
LYMPHOCYTES NFR BLD AUTO: 40.9 %
MAGNESIUM SERPL-MCNC: 1.9 MG/DL (ref 1.6–2.6)
MCH RBC QN AUTO: 29.7 PG (ref 26–34)
MCHC RBC AUTO-ENTMCNC: 33.2 G/DL (ref 31–37)
MCV RBC AUTO: 89.4 FL
MONOCYTES # BLD AUTO: 0.5 X10(3) UL (ref 0.1–1)
MONOCYTES NFR BLD AUTO: 9.8 %
NEUTROPHILS # BLD AUTO: 2.33 X10 (3) UL (ref 1.5–7.7)
NEUTROPHILS # BLD AUTO: 2.33 X10(3) UL (ref 1.5–7.7)
NEUTROPHILS NFR BLD AUTO: 45.9 %
OSMOLALITY SERPL CALC.SUM OF ELEC: 293 MOSM/KG (ref 275–295)
PLATELET # BLD AUTO: 245 10(3)UL (ref 150–450)
POTASSIUM SERPL-SCNC: 4 MMOL/L (ref 3.5–5.1)
PROT SERPL-MCNC: 7 G/DL (ref 5.7–8.2)
RBC # BLD AUTO: 4.82 X10(6)UL
SODIUM SERPL-SCNC: 142 MMOL/L (ref 136–145)
WBC # BLD AUTO: 5.1 X10(3) UL (ref 4–11)

## 2024-12-25 PROCEDURE — 83690 ASSAY OF LIPASE: CPT | Performed by: EMERGENCY MEDICINE

## 2024-12-25 PROCEDURE — 74177 CT ABD & PELVIS W/CONTRAST: CPT | Performed by: EMERGENCY MEDICINE

## 2024-12-25 PROCEDURE — 99284 EMERGENCY DEPT VISIT MOD MDM: CPT

## 2024-12-25 PROCEDURE — 96374 THER/PROPH/DIAG INJ IV PUSH: CPT

## 2024-12-25 PROCEDURE — 85025 COMPLETE CBC W/AUTO DIFF WBC: CPT | Performed by: EMERGENCY MEDICINE

## 2024-12-25 PROCEDURE — 83735 ASSAY OF MAGNESIUM: CPT | Performed by: EMERGENCY MEDICINE

## 2024-12-25 PROCEDURE — 96361 HYDRATE IV INFUSION ADD-ON: CPT

## 2024-12-25 PROCEDURE — 80053 COMPREHEN METABOLIC PANEL: CPT | Performed by: EMERGENCY MEDICINE

## 2024-12-25 PROCEDURE — 81025 URINE PREGNANCY TEST: CPT

## 2024-12-25 RX ORDER — METRONIDAZOLE 500 MG/1
500 TABLET ORAL 3 TIMES DAILY
Qty: 30 TABLET | Refills: 0 | Status: SHIPPED | OUTPATIENT
Start: 2024-12-25 | End: 2025-01-04

## 2024-12-25 RX ORDER — ONDANSETRON 4 MG/1
4 TABLET, ORALLY DISINTEGRATING ORAL EVERY 4 HOURS PRN
Qty: 10 TABLET | Refills: 0 | Status: SHIPPED | OUTPATIENT
Start: 2024-12-25 | End: 2025-01-01

## 2024-12-25 RX ORDER — DICYCLOMINE HCL 20 MG
20 TABLET ORAL
COMMUNITY
Start: 2024-12-23 | End: 2024-12-26

## 2024-12-25 RX ORDER — CIPROFLOXACIN 500 MG/1
500 TABLET, FILM COATED ORAL 2 TIMES DAILY
Qty: 20 TABLET | Refills: 0 | Status: SHIPPED | OUTPATIENT
Start: 2024-12-25 | End: 2025-01-04

## 2024-12-25 RX ORDER — ONDANSETRON 4 MG/1
4 TABLET, ORALLY DISINTEGRATING ORAL EVERY 8 HOURS PRN
COMMUNITY
Start: 2024-12-23 | End: 2024-12-25

## 2024-12-25 RX ORDER — DICYCLOMINE HCL 20 MG
20 TABLET ORAL 4 TIMES DAILY PRN
Qty: 30 TABLET | Refills: 0 | Status: SHIPPED | OUTPATIENT
Start: 2024-12-25 | End: 2025-01-24

## 2024-12-25 RX ORDER — METOCLOPRAMIDE HYDROCHLORIDE 5 MG/ML
10 INJECTION INTRAMUSCULAR; INTRAVENOUS ONCE
Status: COMPLETED | OUTPATIENT
Start: 2024-12-25 | End: 2024-12-25

## 2024-12-25 NOTE — ED PROVIDER NOTES
Patient Seen in: [facility] Emergency Department    History     Chief Complaint   Patient presents with    Nausea/Vomiting/Diarrhea       HPI    24-year-old female who presents here today complaint of nausea, vomiting, diarrhea along with abdominal pain is fine for the past few days.  Patient was seen at [facility] 2 days ago for similar symptoms was discharged home and given meds for home.  Patient did take the meds but not really working.  Patient is concerned because the abdominal pain is worse.  No chest pain or shortness    History reviewed. History reviewed. No pertinent past medical history.    History reviewed.   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      Social History and Family History elements reviewed from today, pertinent positives to the presenting problem noted.    Physical Exam     ED Triage Vitals [24 1246]   /74   Pulse 77   Resp 18   Temp 98.3 °F (36.8 °C)   Temp src Oral   SpO2 97 %   O2 Device None (Room air)       All measures to prevent infection  transmission during my interaction with the patient were taken. Handwashing was performed prior to and after the exam.  Stethoscope and any equipment used during my examination was cleaned with super sani-cloth germicidal wipes following the exam.     Physical Exam  Vitals and nursing note reviewed.   Cardiovascular:      Rate and Rhythm: Normal rate.   Pulmonary:      Effort: Pulmonary effort is normal.   Abdominal:      Palpations: Abdomen is soft.      Tenderness: There is abdominal tenderness in the epigastric area and left lower quadrant.   Skin:     General: Skin is warm and dry.      Capillary Refill: Capillary refill takes less than 2 seconds.   Neurological:      General: No focal deficit present.      Mental Status: She is alert.         ED Course        Labs Reviewed   COMP METABOLIC PANEL (14) - Normal   LIPASE - Normal   MAGNESIUM - Normal   POCT PREGNANCY URINE - Normal   CBC WITH DIFFERENTIAL WITH PLATELET       As Interpreted by me    Imaging Results Available and Reviewed while in ED: CT ABDOMEN+PELVIS(CONTRAST ONLY)(CPT=74177)    Result Date: 12/25/2024  CONCLUSION:  1. Nondilated fluid-filled loops of small bowel throughout the abdomen with additional abnormal fluid throughout the colon to the level of the rectum.  Findings are most compatible with an infectious/inflammatory enterocolitis.  No free intraperitoneal air or well-defined/drainable intra-abdominal collection. 2. Circumferential urinary bladder wall thickening, which may relate to incomplete distention or cystitis.  If there are referable symptoms, urinalysis correlation is requested.   Dictated by (CST): Pawan Suárez MD on 12/25/2024 at 3:38 PM     Finalized by (CST): Pawan Suárez MD on 12/25/2024 at 3:41 PM         ED Medications Administered:   Medications   metoclopramide (Reglan) 5 mg/mL injection 10 mg (10 mg Intravenous Given 12/25/24 1415)   sodium chloride 0.9 % IV bolus 1,000 mL (1,000 mL Intravenous New Bag 12/25/24  1415)   iopamidol 76% (ISOVUE-370) injection for power injector (80 mL Intravenous Given 12/25/24 1508)         MDM     Vitals:    12/25/24 1413 12/25/24 1415 12/25/24 1430 12/25/24 1445   BP: 114/75 110/70 118/81 112/75   Pulse: 69 66 66 59   Resp: 18 18 18 18   Temp:       TempSrc:       SpO2: 98% 99% 98% 99%   Weight:       Height:         *I personally reviewed and interpreted all ED vitals.    Pulse Ox: 97%, Room air, Normal       Differential Diagnosis/ Diagnostic Considerations: Viral syndrome, enteritis, colitis,    Complicating Factors: The patient already has does not have any pertinent problems on file. to contribute to the complexity of this ED evaluation.    Medical Decision Making  Amount and/or Complexity of Data Reviewed  Labs: ordered. Decision-making details documented in ED Course.  Radiology: ordered. Decision-making details documented in ED Course.    Risk  OTC drugs.  Prescription drug management.      Patient feels better after fluids.  Reviewed all results with patient.  Labs did not show anything acute.  I reviewed CT patient has enterocolitis.  I explained the patient we will start her on antibiotics along with pains for meds and nausea. Take all medications as prescribed.  Follow-up with your primary care provider in 1 to 2 days.  Return to the ER if some continue, get worse, unable to follow  Condition upon leaving the department: Stable    Disposition and Plan     Clinical Impression:  1. Enteritis    2. Colitis        Disposition:  Discharge    Follow-up:  Charles Dao DO  [address] [place] [id number]  [phone]    Follow up        Medications Prescribed:  Current Discharge Medication List        START taking these medications    Details   metroNIDAZOLE 500 MG Oral Tab Take 1 tablet (500 mg total) by mouth 3 (three) times daily for 10 days.  Qty: 30 tablet, Refills: 0      ciprofloxacin 500 MG Oral Tab Take 1 tablet (500 mg total) by mouth 2 (two) times daily for 10  days.  Qty: 20 tablet, Refills: 0      !! dicyclomine 20 MG Oral Tab Take 1 tablet (20 mg total) by mouth 4 (four) times daily as needed.  Qty: 30 tablet, Refills: 0      !! ondansetron 4 MG Oral Tablet Dispersible Take 1 tablet (4 mg total) by mouth every 4 (four) hours as needed for Nausea.  Qty: 10 tablet, Refills: 0       !! - Potential duplicate medications found. Please discuss with provider.                           [1] (Not in a hospital admission)

## 2024-12-25 NOTE — DISCHARGE INSTRUCTIONS
Take all medications as prescribed.  Follow-up with your primary care provider in 1 to 2 days.  Return to the ER if some continue, get worse, unable to follow

## (undated) NOTE — MR AVS SNAPSHOT
After Visit Summary   2/15/2024    Tabatha Wayne   MRN: [id number]           Visit Information     Date & Time  2/15/2024  2:45 PM Provider  June Carranza CNM Department  [facility] - Midwifery Dept. Phone  [phone]      Your Vitals Were  Most recent update: 2/15/2024  2:48 PM    BP   110/76          Pulse   74          Ht   63\"          Wt   173 lb          LMP   02/15/2024 (Exact Date)             Breastfeeding   No    BMI   30.65 kg/m²         Allergies as of 2/15/2024  Review status set to Review Complete on 2/15/2024   No Known Allergies     Your Current Medications        Dosage    Norethindrone (ORTHO MICRONOR) 0.35 MG Oral Tab Take 1 tablet (0.35 mg total) by mouth daily.    Prenatal Vit-Fe Fumarate-FA (WESTAB PLUS) 27-1 MG Oral Tab Take 1 tablet by mouth daily.      Diagnoses for This Visit    Women's annual routine gynecological examination   [9168215]  -  Primary  Screen for STD (sexually transmitted disease)   [063523]    Stress due to family tension   [5780955]    Vaginal odor   [047197]    Birth control counseling   [676359]    Introital dyspareunia   [4652193]             Follow-up    Return in about 2 weeks (around 2/29/2024) for IUD insertion.     We Ordered the Following     Normal Orders This Visit    Chlamydia/Gc Amplification [[id number] CUSTOM]     [facility] [[id number] CUSTOM]     Pelvic Floor Therapy - [place] Location [[id number] CUSTOM]     ThinPrep PAP Smear B [[id number] CUSTOM]     THINPREP PAP SMEAR ONLY [[id number] CUSTOM]     Vaginitis Vaginosis PCR Panel [[id number] CUSTOM]     Future Labs/Procedures Expected by Expires    Chlamydia/Gc Amplification [[id number] CUSTOM]  2/15/2024 2/14/2025    ThinPrep PAP Smear B [[id number] CUSTOM]  2/15/2024 2/15/2025    Vaginitis Vaginosis PCR Panel [[id number] CUSTOM]  2/15/2024 (Approximate) 2/15/2025      Follow-up Instructions    Return in about 2 weeks (around 2/29/2024) for IUD insertion.                    Referral to [facility]  Your Provider has referred you to [facility].  A Behavioral Health Navigator will reach out to you within 2 business days and discuss options for Behavioral health Services.  They will be able to work with you to navigate your mental health needs and link you to providers that are in your insurance network.     Your Physician has notified [facility] that a referral has been placed for you.  If you require assistance prior to being contacted, you can call the [facility] Crisis Line at [phone].        Did you know that [facility] primary care physicians now offer Video Visits through [employer] for adult patients for a variety of conditions such as allergies, back pain and cold symptoms? Skip the drive and waiting room and online chat with a doctor face-to-face using your web-cam enabled computer or mobile device wherever you are. Video Visits cost $50 and can be paid hassle-free using a credit, debit, or health savings card.  Not active on [employer]? Ask us how to get signed up today!          If you receive a survey from [facility] [employer]far[employer], please take a few minutes to complete it and provide feedback. We strive to deliver the best patient experience and are looking for ways to make improvements. Your feedback will help us do so. For more information on [employer] [facility], please visit [email or web address]           No text in SmartText           No text in SmartText